# Patient Record
Sex: MALE | Race: WHITE | NOT HISPANIC OR LATINO | Employment: OTHER | URBAN - METROPOLITAN AREA
[De-identification: names, ages, dates, MRNs, and addresses within clinical notes are randomized per-mention and may not be internally consistent; named-entity substitution may affect disease eponyms.]

---

## 2018-06-07 ENCOUNTER — HOSPITAL ENCOUNTER (EMERGENCY)
Facility: HOSPITAL | Age: 23
Discharge: HOME | End: 2018-06-07
Attending: EMERGENCY MEDICINE

## 2018-06-07 VITALS
RESPIRATION RATE: 18 BRPM | OXYGEN SATURATION: 98 % | BODY MASS INDEX: 28.44 KG/M2 | DIASTOLIC BLOOD PRESSURE: 109 MMHG | SYSTOLIC BLOOD PRESSURE: 153 MMHG | HEART RATE: 81 BPM | WEIGHT: 210 LBS | HEIGHT: 72 IN | TEMPERATURE: 99 F

## 2018-06-07 DIAGNOSIS — F44.5 PSEUDOSEIZURE: Primary | ICD-10-CM

## 2018-06-07 PROCEDURE — 99282 EMERGENCY DEPT VISIT SF MDM: CPT

## 2018-06-08 NOTE — ED NOTES
Pt states he is feeling better and is ready to go home.  States he does not need Ativan he feels better.

## 2018-06-08 NOTE — ED PROVIDER NOTES
Encounter Date: 6/7/2018    SCRIBE #1 NOTE: I, Sabi Vargas, am scribing for, and in the presence of,  Dr. Jurado. I have scribed the entire note.       History     Chief Complaint   Patient presents with    Headache     pt to triage ambulatory in no distress and reports had a pseudoseizure today--- had chills and a pressure in head and pain 2/10; no seizure activity at present and pt aao and appropriate with clear speech and oriented    Seizures     Yogi Britt is a 22 y.o. male who  has no past medical history on file.    The patient presents to the ED due to headache. He reports onset of symptoms was a few hours ago. The patient states prior to onset of symptoms he had a pseudoseizure. He notes following the seizure he had cold sensation in his head and pressure. The patient believes he may have aneurysm. He denies any changes in vision, nausea, vomiting, or neck stiffness. The patient has concern he never experienced the cold sensation in the past.       The history is provided by the patient.     Review of patient's allergies indicates:  No Known Allergies  No past medical history on file.  No past surgical history on file.  No family history on file.  Social History   Substance Use Topics    Smoking status: Not on file    Smokeless tobacco: Not on file    Alcohol use Not on file     Review of Systems   Neurological: Positive for seizures (pseudoseizure) and headaches.   All other systems reviewed and are negative.      Physical Exam     Initial Vitals [06/07/18 2122]   BP Pulse Resp Temp SpO2   (!) 153/109 81 18 98.6 °F (37 °C) 98 %      MAP       123.67         Physical Exam    Nursing note and vitals reviewed.  Constitutional: He appears well-developed and well-nourished. He is not diaphoretic. No distress.   HENT:   Head: Normocephalic and atraumatic.   Mouth/Throat: Oropharynx is clear and moist.   Eyes: Conjunctivae and EOM are normal.   Neck: Normal range of motion. Neck supple.    Cardiovascular: Normal rate, regular rhythm and normal heart sounds. Exam reveals no gallop and no friction rub.    No murmur heard.  Pulmonary/Chest: Breath sounds normal. He has no wheezes. He has no rhonchi. He has no rales.   Abdominal: Soft. There is no tenderness. There is no rebound and no guarding.   Musculoskeletal: Normal range of motion. He exhibits no edema or tenderness.   Lymphadenopathy:     He has no cervical adenopathy.   Neurological: He is alert and oriented to person, place, and time. He has normal strength.   Skin: Skin is warm and dry. No rash noted.   Psychiatric: His mood appears anxious.         ED Course   Procedures  Labs Reviewed - No data to display       No orders to display        Medical Decision Making:   ED Management:  Pt convinced that he ruptured and aneurysm today because he had a pseudoseizure and then developed a cold sensation in his head.  I reassured the patient that he obviously doesn't have a ruptured aneurym and that a head ct is not indicated.  He was offered ativan but declined and stated that he wants to leave              Attending Attestation:           Physician Attestation for Scribe:  Physician Attestation Statement for Scribe #1: I, Greg Jurado, reviewed documentation, as scribed by Sabi Vargas in my presence, and it is both accurate and complete.                    Clinical Impression:     1. Pseudoseizure        Disposition:   Disposition: Discharged  Condition: Stable                        Greg Jurado MD  06/07/18 8683

## 2018-06-08 NOTE — ED NOTES
Patient identifiers for Yogi Britt checked and correct.  LOC: The patient is awake, alert and aware of environment with an appropriate affect, the patient is oriented x 3 and speaking appropriately.  APPEARANCE: Patient resting comfortably and in no acute distress, patient is clean and well groomed, patient's clothing are properly fastened.  SKIN: The skin is warm and dry, patient has normal skin turgor and moist mucus membranes, skin intact, no breakdown or brusing noted.  MUSKULOSKELETAL: Patient moving all extremities well, no obvious swelling or deformities noted.  RESPIRATORY: Airway is open and patent, respirations are spontaneous, patient has a normal effort and rate.

## 2022-01-22 ENCOUNTER — OFFICE VISIT (OUTPATIENT)
Dept: URGENT CARE | Facility: CLINIC | Age: 27
End: 2022-01-22
Payer: OTHER GOVERNMENT

## 2022-01-22 VITALS
HEART RATE: 91 BPM | SYSTOLIC BLOOD PRESSURE: 131 MMHG | TEMPERATURE: 99 F | DIASTOLIC BLOOD PRESSURE: 90 MMHG | BODY MASS INDEX: 29.8 KG/M2 | WEIGHT: 220 LBS | OXYGEN SATURATION: 98 % | HEIGHT: 72 IN

## 2022-01-22 DIAGNOSIS — U07.1 COVID-19: Primary | ICD-10-CM

## 2022-01-22 DIAGNOSIS — R09.81 CONGESTION OF PARANASAL SINUS: ICD-10-CM

## 2022-01-22 LAB
CTP QC/QA: YES
SARS-COV-2 RDRP RESP QL NAA+PROBE: POSITIVE

## 2022-01-22 PROCEDURE — U0002 COVID-19 LAB TEST NON-CDC: HCPCS | Mod: QW,S$GLB,, | Performed by: NURSE PRACTITIONER

## 2022-01-22 PROCEDURE — 99203 OFFICE O/P NEW LOW 30 MIN: CPT | Mod: S$GLB,,, | Performed by: NURSE PRACTITIONER

## 2022-01-22 PROCEDURE — 99203 PR OFFICE/OUTPT VISIT, NEW, LEVL III, 30-44 MIN: ICD-10-PCS | Mod: S$GLB,,, | Performed by: NURSE PRACTITIONER

## 2022-01-22 PROCEDURE — U0002: ICD-10-PCS | Mod: QW,S$GLB,, | Performed by: NURSE PRACTITIONER

## 2022-01-22 RX ORDER — AZELASTINE 1 MG/ML
1 SPRAY, METERED NASAL 2 TIMES DAILY PRN
Qty: 30 ML | Refills: 0 | Status: SHIPPED | OUTPATIENT
Start: 2022-01-22

## 2022-01-22 RX ORDER — BENZONATATE 100 MG/1
100 CAPSULE ORAL 3 TIMES DAILY PRN
Qty: 30 CAPSULE | Refills: 0 | Status: SHIPPED | OUTPATIENT
Start: 2022-01-22 | End: 2022-02-01

## 2022-01-22 RX ORDER — BUPROPION HYDROCHLORIDE 150 MG/1
150 TABLET ORAL DAILY
COMMUNITY

## 2022-01-22 NOTE — PROGRESS NOTES
Subjective:       Patient ID: Yogi Britt is a 26 y.o. male.    Vitals:  height is 6' (1.829 m) and weight is 99.8 kg (220 lb). His temperature is 98.8 °F (37.1 °C). His blood pressure is 131/90 (abnormal) and his pulse is 91. His oxygen saturation is 98%.     Chief Complaint: Sinus Problem    This is a 26 y.o. male who presents today with a chief complaint of ear pain, congestion, sore throat, cough and sneezing started one week ago, patient reports he is vaccinated, patient denies fever, body aches or chills, denies wheezing or shortness of breath, denies nausea, vomiting, diarrhea or abdominal pain, denies chest pain or dizziness positional lightheadedness, denies trouble swallowing, denies loss of taste or smell, or any other symptoms        Sinus Problem  This is a new problem. The current episode started in the past 7 days (7 days). The problem has been gradually worsening since onset. There has been no fever. His pain is at a severity of 3/10. The pain is mild. Associated symptoms include congestion, coughing, ear pain, headaches, sinus pressure, sneezing and a sore throat. Pertinent negatives include no chills, diaphoresis or shortness of breath. ( ear pressure ) Past treatments include oral decongestants (mucinex steam bath). The treatment provided mild relief.       Constitution: Negative for chills, sweating, fatigue and fever.   HENT: Positive for ear pain, congestion, sinus pressure and sore throat. Negative for trouble swallowing.    Respiratory: Positive for cough. Negative for chest tightness, sputum production and shortness of breath.    Gastrointestinal: Negative for abdominal pain, nausea, vomiting, constipation and diarrhea.   Allergic/Immunologic: Positive for sneezing. Negative for environmental allergies and seasonal allergies.   Neurological: Positive for headaches. Negative for dizziness and light-headedness.       Objective:      Physical Exam   Constitutional: He is oriented to person,  place, and time. He appears well-developed and well-nourished. He is cooperative.  Non-toxic appearance. He does not have a sickly appearance. He does not appear ill. No distress.   HENT:   Head: Normocephalic and atraumatic.   Ears:   Right Ear: Hearing, tympanic membrane, external ear and ear canal normal. Tympanic membrane is not erythematous. No middle ear effusion.   Left Ear: Hearing, tympanic membrane, external ear and ear canal normal. Tympanic membrane is not erythematous.  No middle ear effusion.   Nose: Nose normal. No mucosal edema, rhinorrhea or nasal deformity. No epistaxis. Right sinus exhibits no maxillary sinus tenderness and no frontal sinus tenderness. Left sinus exhibits no maxillary sinus tenderness and no frontal sinus tenderness.   Mouth/Throat: Uvula is midline, oropharynx is clear and moist and mucous membranes are normal. No trismus in the jaw. Normal dentition. No uvula swelling. No oropharyngeal exudate, posterior oropharyngeal edema, posterior oropharyngeal erythema, tonsillar abscesses or cobblestoning.   No sinus tenderness on exam      Comments: No sinus tenderness on exam  Eyes: Conjunctivae and lids are normal. No scleral icterus.   Neck: Phonation normal. Neck supple. No edema present. No erythema present. No neck rigidity present.   Cardiovascular: Normal rate, regular rhythm, normal heart sounds, intact distal pulses and normal pulses.   Pulmonary/Chest: Effort normal and breath sounds normal. No stridor. No respiratory distress. He has no decreased breath sounds. He has no wheezes. He has no rhonchi. He has no rales.   Abdominal: Normal appearance.   Musculoskeletal: Normal range of motion.         General: No deformity or edema. Normal range of motion.   Lymphadenopathy:     He has no cervical adenopathy.   Neurological: He is alert and oriented to person, place, and time. He exhibits normal muscle tone. Coordination normal.   Skin: Skin is warm, dry, intact, not diaphoretic  and not pale.   Psychiatric: He has a normal mood and affect. His speech is normal and behavior is normal. Judgment and thought content normal. Cognition and memory  Nursing note and vitals reviewed.        Results for orders placed or performed in visit on 01/22/22   POCT COVID-19 Rapid Screening   Result Value Ref Range    POC Rapid COVID Positive (A) Negative     Acceptable Yes          Patient in no acute distress.  Vitals reassuring. Positive covid 19 results discussed with patient in detail. Detailed education provided about COVID 19 precautions and recommendations as per CDC guidelines. Risk of complication score 1. Medications prescribed and oTC medications discussed.   Discussed results/diagnosis/plan in depth with patient in clinic. Strict precautions given to patient to monitor for worsening signs and symptoms. Advised to follow up with primary.All questions answered. Strict ER precautions given. If your symptoms worsens or fail to improve you should go to the Emergency Room. Discharge and follow-up instructions given verbally/printed. Discharge and follow-up instructions discussed with the patient who expressed understanding and willingness to comply with my recommendations.Patient voiced understanding and in agreement with current treatment plan.     Please be advised this text was dictated with [employer] software and may contain errors due to translation.      Assessment:       1. COVID-19    2. Congestion of paranasal sinus          Plan:         COVID-19  -     POCT COVID-19 Rapid Screening    Congestion of paranasal sinus    Other orders  -     benzonatate (TESSALON) 100 MG capsule; Take 1 capsule (100 mg total) by mouth 3 (three) times daily as needed for Cough.  Dispense: 30 capsule; Refill: 0  -     azelastine (ASTELIN) 137 mcg (0.1 %) nasal spray; 1 spray (137 mcg total) by Nasal route 2 (two) times daily as needed for Rhinitis.  Dispense: 30 mL; Refill: 0      Patient Instructions    PLEASE READ YOUR DISCHARGE INSTRUCTIONS ENTIRELY AS IT CONTAINS IMPORTANT INFORMATION.      Please drink plenty of fluids.    Please get plenty of rest.    Please return here or go to the Emergency Department for any concerns or worsening of condition.    Please take an over the counter antihistamine medication (allegra/Claritin/Zyrtec) of your choice as directed.    Try an over the counter decongestant like Mucinex D or Sudafed. You buy this behind the pharmacy counter    If you do have Hypertension or palpitations, it is safe to take Coricidin HBP for relief of sinus symptoms.    If not allergic, please take over the counter Tylenol (Acetaminophen) and/or Motrin (Ibuprofen) as directed for control of pain and/or fever.  Please follow up with your primary care doctor or specialist as needed.    Sore throat recommendations: Warm fluids, warm salt water gargles, throat lozenges, tea, honey, soup, rest, hydration.    Use over the counter flonase: one spray each nostril twice daily OR two sprays each nostril once daily.     If you  smoke, please stop smoking.      Please return or see your primary care doctor if you develop new or worsening symptoms.     Please arrange follow up with your primary medical clinic as soon as possible. You must understand that you've received an Urgent Care treatment only and that you may be released before all of your medical problems are known or treated. You, the patient, will arrange for follow up as instructed. If your symptoms worsen or fail to improve you should go to the Emergency Room.  Patient Education       COVID-19 Discharge Instructions   About this topic   Coronavirus disease 2019 is also known as COVID-19. It is a viral illness that infects the lungs. It is caused by a virus called SARS-associated coronavirus (SARS-CoV-2).  The signs of COVID-19 most often start a few days after you have been infected. In some people, it takes longer to show signs. Others never show signs  of the infection. You may have a cough, fever, shaking chills and it may be hard to breathe. You may be very tired, have muscle aches, a headache or sore throat. Some people have an upset stomach or loose stools. Others lose their sense of smell or taste. You may not have these signs all the time and they may come and go while you are sick.  The virus spreads easily through droplets when you talk, sneeze, or cough. You can pass the virus to others when you are talking close together, singing, hugging, sharing food, or shaking hands. Doctors believe the germs also survive on surfaces like tables, door handles, and telephones. However, this is not a common way that COVID-19 spreads. Doctors believe you can also spread the infection even if you dont have any symptoms, but they do not know how that happens. This is why getting vaccinated is one of the best ways to keep you healthy and slow the spread of the virus.  Some people have a mild case of COVID-19 and are able to stay at home and away from others until they feel better. Others may need to be in the hospital if they are very sick. Some people with COVID-19 can have some symptoms for weeks or months. People with COVID-19 must isolate themselves. You can start to be around others when your doctor says it is safe to do so.       What care is needed at home?   · Ask your doctor what you need to do when you go home. Make sure you ask questions if you do not understand what the doctor says.  · Drink lots of water, juice, or broth to replace fluids lost from a fever.  · You may use cool mist humidifiers to help ease congestion and coughing.  · Use 2 to 3 pillows to prop yourself up when you lie down to make it easier to breathe and sleep.  · Do not smoke and do not drink beer, wine, and mixed drinks (alcohol).  · To lower the chance of passing the infection to others, get a COVID-19 vaccine after your infection has resolved.  · If you have not been fully  vaccinated:  ? Wear a mask over your mouth and nose if you are around others who are not sick. Cloth masks work best if they have more than one layer of fabric.  ? Wash your hands often.  ? Stay home in a separate room, if possible, away from others. Only go out to get medical care.  ? Use a separate bathroom if possible.  ? Do not make food for others.  What follow-up care is needed?   · Your doctor may ask you to make visits to the office to check on your progress. Be sure to keep these visits. Make sure you wear a mask at these visits.  · If you can, tell the staff you have COVID-19 ahead of time so they can take extra care to stop the disease from spreading.  · It may take a few weeks before your health returns to normal.  What drugs may be needed?   The doctor may order drugs to:  · Help with breathing  · Help with fever  · Help with swelling in your airways and lungs  · Control coughing  · Ease a sore throat  · Help a runny or stuffy nose  Will physical activity be limited?   You may have to limit your physical activity. Talk to your doctor about the right amount of activity for you. If you have been very sick with COVID-19, it can take some time to get your strength back.  Will there be any other care needed?   Doctors do not know how long you can pass the virus on to others after you are sick. This is why it is important to stay in a separate room, if possible, when you are sick. For now, doctors are giving general guidelines for you to follow after you have been sick. Before you go around other people, you should:  · Be fever free for 24 hours without taking any drugs to lower the fever  · Have no symptoms of cough or shortness of breath  · Wait at least 10 days after first having symptoms or your first positive test, and you need to be symptom free as above. Some experts suggest waiting 20 days if you have had a more severe infection.  Talk with your doctor about getting a COVID-19 vaccine.  What problems  could happen?   · Fluid loss. This is dehydration.  · Short-term or long-term lung damage  · Heart problems  · Death  When do I need to call the doctor?   · You are having so much trouble breathing that you can only say one or two words at a time.  · You need to sit upright at all times to be able to breathe and/or cannot lie down.  · You are very confused or cannot stay awake.  · Your lips or skin start to turn blue or grey.  · You think you might be having a medical emergency. Some examples of medical emergencies are:  ? Severe chest pain.  ? Not able to speak or move normally.  · You have trouble breathing when talking or sitting still.  · You have new shortness of breath.  · You become weak or dizzy.  · You have very dark urine or do not pass urine for more than 8 hours.  · You have new or worsening COVID-19 symptoms like:  ? Fever  ? Cough  ? Feeling very tired  ? Shaking chills  ? Headache  ? Trouble swallowing  ? Throwing up  ? Loose stools  ? Reddish purple spots on your fingers or toes  Teach Back: Helping You Understand   The Teach Back Method helps you understand the information we are giving you. After you talk with the staff, tell them in your own words what you learned. This helps to make sure the staff has described each thing clearly. It also helps to explain things that may have been confusing. Before going home, make sure you can do these:  · I can tell you about my condition.  · I can tell you what may help ease my breathing.  · I can tell you what I can do to help avoid passing the infection to others.  · I can tell you what I will do if I have trouble breathing; feel sleepy or confused; or my fingertips, fingernails, skin, or lips are blue.  Where can I learn more?   Centers for Disease Control and Prevention  https://www.cdc.gov/coronavirus/2019-ncov/about/index.html   Centers for Disease Control and Prevention  https://www.cdc.gov/coronavirus/2019-ncov/hcp/disposition-in-home-patients.html    World Health Organization  https://www.who.int/news-room/q-a-detail/c-x-vkmwkpukfxnff   Last Reviewed Date   2021-10-05  Consumer Information Use and Disclaimer   This information is not specific medical advice and does not replace information you receive from your health care provider. This is only a brief summary of general information. It does NOT include all information about conditions, illnesses, injuries, tests, procedures, treatments, therapies, discharge instructions or life-style choices that may apply to you. You must talk with your health care provider for complete information about your health and treatment options. This information should not be used to decide whether or not to accept your health care providers advice, instructions or recommendations. Only your health care provider has the knowledge and training to provide advice that is right for you.  Copyright   Copyright © 2021 [employer] and its affiliates and/or licensors. All rights reserved.

## 2022-01-22 NOTE — PATIENT INSTRUCTIONS
PLEASE READ YOUR DISCHARGE INSTRUCTIONS ENTIRELY AS IT CONTAINS IMPORTANT INFORMATION.      Please drink plenty of fluids.    Please get plenty of rest.    Please return here or go to the Emergency Department for any concerns or worsening of condition.    Please take an over the counter antihistamine medication (allegra/Claritin/Zyrtec) of your choice as directed.    Try an over the counter decongestant like Mucinex D or Sudafed. You buy this behind the pharmacy counter    If you do have Hypertension or palpitations, it is safe to take Coricidin HBP for relief of sinus symptoms.    If not allergic, please take over the counter Tylenol (Acetaminophen) and/or Motrin (Ibuprofen) as directed for control of pain and/or fever.  Please follow up with your primary care doctor or specialist as needed.    Sore throat recommendations: Warm fluids, warm salt water gargles, throat lozenges, tea, honey, soup, rest, hydration.    Use over the counter flonase: one spray each nostril twice daily OR two sprays each nostril once daily.     If you  smoke, please stop smoking.      Please return or see your primary care doctor if you develop new or worsening symptoms.     Please arrange follow up with your primary medical clinic as soon as possible. You must understand that you've received an Urgent Care treatment only and that you may be released before all of your medical problems are known or treated. You, the patient, will arrange for follow up as instructed. If your symptoms worsen or fail to improve you should go to the Emergency Room.  Patient Education       COVID-19 Discharge Instructions   About this topic   Coronavirus disease 2019 is also known as COVID-19. It is a viral illness that infects the lungs. It is caused by a virus called SARS-associated coronavirus (SARS-CoV-2).  The signs of COVID-19 most often start a few days after you have been infected. In some people, it takes longer to show signs. Others never show signs  of the infection. You may have a cough, fever, shaking chills and it may be hard to breathe. You may be very tired, have muscle aches, a headache or sore throat. Some people have an upset stomach or loose stools. Others lose their sense of smell or taste. You may not have these signs all the time and they may come and go while you are sick.  The virus spreads easily through droplets when you talk, sneeze, or cough. You can pass the virus to others when you are talking close together, singing, hugging, sharing food, or shaking hands. Doctors believe the germs also survive on surfaces like tables, door handles, and telephones. However, this is not a common way that COVID-19 spreads. Doctors believe you can also spread the infection even if you dont have any symptoms, but they do not know how that happens. This is why getting vaccinated is one of the best ways to keep you healthy and slow the spread of the virus.  Some people have a mild case of COVID-19 and are able to stay at home and away from others until they feel better. Others may need to be in the hospital if they are very sick. Some people with COVID-19 can have some symptoms for weeks or months. People with COVID-19 must isolate themselves. You can start to be around others when your doctor says it is safe to do so.       What care is needed at home?   · Ask your doctor what you need to do when you go home. Make sure you ask questions if you do not understand what the doctor says.  · Drink lots of water, juice, or broth to replace fluids lost from a fever.  · You may use cool mist humidifiers to help ease congestion and coughing.  · Use 2 to 3 pillows to prop yourself up when you lie down to make it easier to breathe and sleep.  · Do not smoke and do not drink beer, wine, and mixed drinks (alcohol).  · To lower the chance of passing the infection to others, get a COVID-19 vaccine after your infection has resolved.  · If you have not been fully  vaccinated:  ? Wear a mask over your mouth and nose if you are around others who are not sick. Cloth masks work best if they have more than one layer of fabric.  ? Wash your hands often.  ? Stay home in a separate room, if possible, away from others. Only go out to get medical care.  ? Use a separate bathroom if possible.  ? Do not make food for others.  What follow-up care is needed?   · Your doctor may ask you to make visits to the office to check on your progress. Be sure to keep these visits. Make sure you wear a mask at these visits.  · If you can, tell the staff you have COVID-19 ahead of time so they can take extra care to stop the disease from spreading.  · It may take a few weeks before your health returns to normal.  What drugs may be needed?   The doctor may order drugs to:  · Help with breathing  · Help with fever  · Help with swelling in your airways and lungs  · Control coughing  · Ease a sore throat  · Help a runny or stuffy nose  Will physical activity be limited?   You may have to limit your physical activity. Talk to your doctor about the right amount of activity for you. If you have been very sick with COVID-19, it can take some time to get your strength back.  Will there be any other care needed?   Doctors do not know how long you can pass the virus on to others after you are sick. This is why it is important to stay in a separate room, if possible, when you are sick. For now, doctors are giving general guidelines for you to follow after you have been sick. Before you go around other people, you should:  · Be fever free for 24 hours without taking any drugs to lower the fever  · Have no symptoms of cough or shortness of breath  · Wait at least 10 days after first having symptoms or your first positive test, and you need to be symptom free as above. Some experts suggest waiting 20 days if you have had a more severe infection.  Talk with your doctor about getting a COVID-19 vaccine.  What problems  could happen?   · Fluid loss. This is dehydration.  · Short-term or long-term lung damage  · Heart problems  · Death  When do I need to call the doctor?   · You are having so much trouble breathing that you can only say one or two words at a time.  · You need to sit upright at all times to be able to breathe and/or cannot lie down.  · You are very confused or cannot stay awake.  · Your lips or skin start to turn blue or grey.  · You think you might be having a medical emergency. Some examples of medical emergencies are:  ? Severe chest pain.  ? Not able to speak or move normally.  · You have trouble breathing when talking or sitting still.  · You have new shortness of breath.  · You become weak or dizzy.  · You have very dark urine or do not pass urine for more than 8 hours.  · You have new or worsening COVID-19 symptoms like:  ? Fever  ? Cough  ? Feeling very tired  ? Shaking chills  ? Headache  ? Trouble swallowing  ? Throwing up  ? Loose stools  ? Reddish purple spots on your fingers or toes  Teach Back: Helping You Understand   The Teach Back Method helps you understand the information we are giving you. After you talk with the staff, tell them in your own words what you learned. This helps to make sure the staff has described each thing clearly. It also helps to explain things that may have been confusing. Before going home, make sure you can do these:  · I can tell you about my condition.  · I can tell you what may help ease my breathing.  · I can tell you what I can do to help avoid passing the infection to others.  · I can tell you what I will do if I have trouble breathing; feel sleepy or confused; or my fingertips, fingernails, skin, or lips are blue.  Where can I learn more?   Centers for Disease Control and Prevention  https://www.cdc.gov/coronavirus/2019-ncov/about/index.html   Centers for Disease Control and Prevention  https://www.cdc.gov/coronavirus/2019-ncov/hcp/disposition-in-home-patients.html    World Health Organization  https://www.who.int/news-room/q-a-detail/p-x-okeokcjvnampg   Last Reviewed Date   2021-10-05  Consumer Information Use and Disclaimer   This information is not specific medical advice and does not replace information you receive from your health care provider. This is only a brief summary of general information. It does NOT include all information about conditions, illnesses, injuries, tests, procedures, treatments, therapies, discharge instructions or life-style choices that may apply to you. You must talk with your health care provider for complete information about your health and treatment options. This information should not be used to decide whether or not to accept your health care providers advice, instructions or recommendations. Only your health care provider has the knowledge and training to provide advice that is right for you.  Copyright   Copyright © 2021 [employer] and its affiliates and/or licensors. All rights reserved.

## 2023-04-19 ENCOUNTER — OCCUPATIONAL HEALTH (OUTPATIENT)
Dept: URGENT CARE | Facility: CLINIC | Age: 28
End: 2023-04-19

## 2023-04-19 VITALS
OXYGEN SATURATION: 98 % | WEIGHT: 231.19 LBS | SYSTOLIC BLOOD PRESSURE: 130 MMHG | HEART RATE: 75 BPM | HEIGHT: 72 IN | BODY MASS INDEX: 31.31 KG/M2 | DIASTOLIC BLOOD PRESSURE: 88 MMHG | TEMPERATURE: 99 F | RESPIRATION RATE: 17 BRPM

## 2023-04-19 DIAGNOSIS — Z00.00 ENCOUNTER FOR PHYSICAL EXAMINATION: Primary | ICD-10-CM

## 2023-04-19 PROCEDURE — 99499 UNLISTED E&M SERVICE: CPT | Mod: S$GLB,,, | Performed by: FAMILY MEDICINE

## 2023-04-19 PROCEDURE — 80305 OOH NON-DOT DRUG SCREEN: ICD-10-PCS | Mod: S$GLB,,, | Performed by: FAMILY MEDICINE

## 2023-04-19 PROCEDURE — 80305 DRUG TEST PRSMV DIR OPT OBS: CPT | Mod: S$GLB,,, | Performed by: FAMILY MEDICINE

## 2023-04-19 PROCEDURE — 99499 PHYSICAL, BASIC COMPLEXITY: ICD-10-PCS | Mod: S$GLB,,, | Performed by: FAMILY MEDICINE

## 2023-04-19 RX ORDER — DESVENLAFAXINE SUCCINATE 50 MG/1
100 TABLET, EXTENDED RELEASE ORAL DAILY
COMMUNITY

## 2023-04-19 NOTE — PROGRESS NOTES
Subjective:      Patient ID: Yogi Britt is a 27 y.o. male.    Chief Complaint: No chief complaint on file.    SRL  ROS  Objective:     Physical Exam   Assessment:      No diagnosis found.  Plan:                 No follow-ups on file.

## 2024-08-11 ENCOUNTER — HOSPITAL ENCOUNTER (INPATIENT)
Facility: HOSPITAL | Age: 29
LOS: 1 days | Discharge: HOME | DRG: 916 | End: 2024-08-13
Attending: STUDENT IN AN ORGANIZED HEALTH CARE EDUCATION/TRAINING PROGRAM | Admitting: FAMILY MEDICINE
Payer: OTHER GOVERNMENT

## 2024-08-11 DIAGNOSIS — R57.9 SHOCK: ICD-10-CM

## 2024-08-11 DIAGNOSIS — R65.10 SIRS (SYSTEMIC INFLAMMATORY RESPONSE SYNDROME): ICD-10-CM

## 2024-08-11 DIAGNOSIS — T78.2XXA ANAPHYLACTIC SHOCK: Primary | ICD-10-CM

## 2024-08-11 DIAGNOSIS — I95.9 HYPOTENSION: ICD-10-CM

## 2024-08-11 DIAGNOSIS — R07.9 CHEST PAIN: ICD-10-CM

## 2024-08-11 PROBLEM — D75.1 POLYCYTHEMIA: Status: ACTIVE | Noted: 2024-08-11

## 2024-08-11 PROBLEM — D72.829 LEUCOCYTOSIS: Status: ACTIVE | Noted: 2024-08-11

## 2024-08-11 PROBLEM — K52.9 ENTEROCOLITIS: Status: ACTIVE | Noted: 2024-08-11

## 2024-08-11 PROBLEM — K52.9 ENTEROCOLITIS: Status: RESOLVED | Noted: 2024-08-11 | Resolved: 2024-08-11

## 2024-08-11 LAB
ABO + RH BLD: NORMAL
ALBUMIN SERPL BCP-MCNC: 3.5 G/DL (ref 3.5–5.2)
ALP SERPL-CCNC: 59 U/L (ref 55–135)
ALT SERPL W/O P-5'-P-CCNC: 23 U/L (ref 10–44)
ANION GAP SERPL CALC-SCNC: 10 MMOL/L (ref 8–16)
AST SERPL-CCNC: 22 U/L (ref 10–40)
BACTERIA #/AREA URNS HPF: ABNORMAL /HPF
BASOPHILS # BLD AUTO: 0.05 K/UL (ref 0–0.2)
BASOPHILS # BLD AUTO: 0.09 K/UL (ref 0–0.2)
BASOPHILS # BLD AUTO: 0.12 K/UL (ref 0–0.2)
BASOPHILS NFR BLD: 0.2 % (ref 0–1.9)
BASOPHILS NFR BLD: 0.4 % (ref 0–1.9)
BASOPHILS NFR BLD: 0.5 % (ref 0–1.9)
BILIRUB SERPL-MCNC: 0.3 MG/DL (ref 0.1–1)
BILIRUB UR QL STRIP: NEGATIVE
BLD GP AB SCN CELLS X3 SERPL QL: NORMAL
BNP SERPL-MCNC: 13 PG/ML (ref 0–99)
BUN SERPL-MCNC: 18 MG/DL (ref 6–20)
CALCIUM SERPL-MCNC: 8.1 MG/DL (ref 8.7–10.5)
CHLORIDE SERPL-SCNC: 106 MMOL/L (ref 95–110)
CLARITY UR: CLEAR
CO2 SERPL-SCNC: 20 MMOL/L (ref 23–29)
COLOR UR: YELLOW
CREAT SERPL-MCNC: 1 MG/DL (ref 0.5–1.4)
DIFFERENTIAL METHOD BLD: ABNORMAL
EOSINOPHIL # BLD AUTO: 0 K/UL (ref 0–0.5)
EOSINOPHIL # BLD AUTO: 0.2 K/UL (ref 0–0.5)
EOSINOPHIL # BLD AUTO: 0.2 K/UL (ref 0–0.5)
EOSINOPHIL NFR BLD: 0 % (ref 0–8)
EOSINOPHIL NFR BLD: 0.6 % (ref 0–8)
EOSINOPHIL NFR BLD: 1 % (ref 0–8)
ERYTHROCYTE [DISTWIDTH] IN BLOOD BY AUTOMATED COUNT: 12.3 % (ref 11.5–14.5)
ERYTHROCYTE [DISTWIDTH] IN BLOOD BY AUTOMATED COUNT: 12.3 % (ref 11.5–14.5)
ERYTHROCYTE [DISTWIDTH] IN BLOOD BY AUTOMATED COUNT: 12.4 % (ref 11.5–14.5)
EST. GFR  (NO RACE VARIABLE): >60 ML/MIN/1.73 M^2
GLUCOSE SERPL-MCNC: 169 MG/DL (ref 70–110)
GLUCOSE SERPL-MCNC: 199 MG/DL (ref 70–110)
GLUCOSE SERPL-MCNC: 215 MG/DL (ref 70–110)
GLUCOSE SERPL-MCNC: 242 MG/DL (ref 70–110)
GLUCOSE SERPL-MCNC: 267 MG/DL (ref 70–110)
GLUCOSE UR QL STRIP: ABNORMAL
HCT VFR BLD AUTO: 53.7 % (ref 40–54)
HCT VFR BLD AUTO: 57.9 % (ref 40–54)
HCT VFR BLD AUTO: 60.2 % (ref 40–54)
HGB BLD-MCNC: 18.4 G/DL (ref 14–18)
HGB BLD-MCNC: 20.4 G/DL (ref 14–18)
HGB BLD-MCNC: 20.4 G/DL (ref 14–18)
HGB UR QL STRIP: NEGATIVE
HYALINE CASTS #/AREA URNS LPF: 2 /LPF
IMM GRANULOCYTES # BLD AUTO: 0.15 K/UL (ref 0–0.04)
IMM GRANULOCYTES # BLD AUTO: 0.29 K/UL (ref 0–0.04)
IMM GRANULOCYTES # BLD AUTO: 0.32 K/UL (ref 0–0.04)
IMM GRANULOCYTES NFR BLD AUTO: 0.8 % (ref 0–0.5)
IMM GRANULOCYTES NFR BLD AUTO: 1 % (ref 0–0.5)
IMM GRANULOCYTES NFR BLD AUTO: 1.4 % (ref 0–0.5)
KETONES UR QL STRIP: ABNORMAL
LACTATE SERPL-SCNC: 2.8 MMOL/L (ref 0.5–1.9)
LACTATE SERPL-SCNC: 3.7 MMOL/L (ref 0.5–1.9)
LEUKOCYTE ESTERASE UR QL STRIP: NEGATIVE
LIPASE SERPL-CCNC: 36 U/L (ref 4–60)
LYMPHOCYTES # BLD AUTO: 1.1 K/UL (ref 1–4.8)
LYMPHOCYTES # BLD AUTO: 11.2 K/UL (ref 1–4.8)
LYMPHOCYTES # BLD AUTO: 6.5 K/UL (ref 1–4.8)
LYMPHOCYTES NFR BLD: 19.8 % (ref 18–48)
LYMPHOCYTES NFR BLD: 5.5 % (ref 18–48)
LYMPHOCYTES NFR BLD: 57.6 % (ref 18–48)
MAGNESIUM SERPL-MCNC: 1.9 MG/DL (ref 1.6–2.6)
MCH RBC QN AUTO: 30.1 PG (ref 27–31)
MCH RBC QN AUTO: 30.2 PG (ref 27–31)
MCH RBC QN AUTO: 30.3 PG (ref 27–31)
MCHC RBC AUTO-ENTMCNC: 33.9 G/DL (ref 32–36)
MCHC RBC AUTO-ENTMCNC: 34.3 G/DL (ref 32–36)
MCHC RBC AUTO-ENTMCNC: 35.2 G/DL (ref 32–36)
MCV RBC AUTO: 86 FL (ref 82–98)
MCV RBC AUTO: 88 FL (ref 82–98)
MCV RBC AUTO: 90 FL (ref 82–98)
MICROSCOPIC COMMENT: ABNORMAL
MONOCYTES # BLD AUTO: 0.3 K/UL (ref 0.3–1)
MONOCYTES # BLD AUTO: 0.5 K/UL (ref 0.3–1)
MONOCYTES # BLD AUTO: 1.6 K/UL (ref 0.3–1)
MONOCYTES NFR BLD: 1.2 % (ref 4–15)
MONOCYTES NFR BLD: 2.7 % (ref 4–15)
MONOCYTES NFR BLD: 4.9 % (ref 4–15)
NEUTROPHILS # BLD AUTO: 18.5 K/UL (ref 1.8–7.7)
NEUTROPHILS # BLD AUTO: 24.1 K/UL (ref 1.8–7.7)
NEUTROPHILS # BLD AUTO: 7.3 K/UL (ref 1.8–7.7)
NEUTROPHILS NFR BLD: 37.4 % (ref 38–73)
NEUTROPHILS NFR BLD: 73.3 % (ref 38–73)
NEUTROPHILS NFR BLD: 91.7 % (ref 38–73)
NITRITE UR QL STRIP: NEGATIVE
NRBC BLD-RTO: 0 /100 WBC
OB PNL STL: NEGATIVE
OHS QRS DURATION: 92 MS
OHS QTC CALCULATION: 471 MS
PH UR STRIP: 6 [PH] (ref 5–8)
PLATELET # BLD AUTO: 290 K/UL (ref 150–450)
PLATELET # BLD AUTO: 360 K/UL (ref 150–450)
PLATELET # BLD AUTO: 397 K/UL (ref 150–450)
PLATELET BLD QL SMEAR: ABNORMAL
PMV BLD AUTO: 10 FL (ref 9.2–12.9)
PMV BLD AUTO: 9.6 FL (ref 9.2–12.9)
PMV BLD AUTO: 9.8 FL (ref 9.2–12.9)
POTASSIUM SERPL-SCNC: 3.9 MMOL/L (ref 3.5–5.1)
PROT SERPL-MCNC: 5.7 G/DL (ref 6–8.4)
PROT UR QL STRIP: ABNORMAL
RBC # BLD AUTO: 6.12 M/UL (ref 4.6–6.2)
RBC # BLD AUTO: 6.73 M/UL (ref 4.6–6.2)
RBC # BLD AUTO: 6.75 M/UL (ref 4.6–6.2)
RBC #/AREA URNS HPF: 1 /HPF (ref 0–4)
SODIUM SERPL-SCNC: 136 MMOL/L (ref 136–145)
SP GR UR STRIP: >1.03 (ref 1–1.03)
SPECIMEN OUTDATE: NORMAL
SQUAMOUS #/AREA URNS HPF: 1 /HPF
TROPONIN I SERPL HS-MCNC: 3.5 PG/ML (ref 0–14.9)
TSH SERPL DL<=0.005 MIU/L-ACNC: 3.31 UIU/ML (ref 0.34–5.6)
URN SPEC COLLECT METH UR: ABNORMAL
UROBILINOGEN UR STRIP-ACNC: NEGATIVE EU/DL
WBC # BLD AUTO: 19.47 K/UL (ref 3.9–12.7)
WBC # BLD AUTO: 20.22 K/UL (ref 3.9–12.7)
WBC # BLD AUTO: 32.8 K/UL (ref 3.9–12.7)
WBC #/AREA URNS HPF: 1 /HPF (ref 0–5)
YEAST URNS QL MICRO: ABNORMAL

## 2024-08-11 PROCEDURE — 84443 ASSAY THYROID STIM HORMONE: CPT | Performed by: STUDENT IN AN ORGANIZED HEALTH CARE EDUCATION/TRAINING PROGRAM

## 2024-08-11 PROCEDURE — 83036 HEMOGLOBIN GLYCOSYLATED A1C: CPT | Performed by: STUDENT IN AN ORGANIZED HEALTH CARE EDUCATION/TRAINING PROGRAM

## 2024-08-11 PROCEDURE — 81001 URINALYSIS AUTO W/SCOPE: CPT | Performed by: STUDENT IN AN ORGANIZED HEALTH CARE EDUCATION/TRAINING PROGRAM

## 2024-08-11 PROCEDURE — G0378 HOSPITAL OBSERVATION PER HR: HCPCS

## 2024-08-11 PROCEDURE — 99291 CRITICAL CARE FIRST HOUR: CPT

## 2024-08-11 PROCEDURE — 82962 GLUCOSE BLOOD TEST: CPT

## 2024-08-11 PROCEDURE — 63600175 PHARM REV CODE 636 W HCPCS: Performed by: STUDENT IN AN ORGANIZED HEALTH CARE EDUCATION/TRAINING PROGRAM

## 2024-08-11 PROCEDURE — 86901 BLOOD TYPING SEROLOGIC RH(D): CPT | Performed by: STUDENT IN AN ORGANIZED HEALTH CARE EDUCATION/TRAINING PROGRAM

## 2024-08-11 PROCEDURE — 25000003 PHARM REV CODE 250: Performed by: STUDENT IN AN ORGANIZED HEALTH CARE EDUCATION/TRAINING PROGRAM

## 2024-08-11 PROCEDURE — 99205 OFFICE O/P NEW HI 60 MIN: CPT | Mod: ,,, | Performed by: INTERNAL MEDICINE

## 2024-08-11 PROCEDURE — 82668 ASSAY OF ERYTHROPOIETIN: CPT | Performed by: STUDENT IN AN ORGANIZED HEALTH CARE EDUCATION/TRAINING PROGRAM

## 2024-08-11 PROCEDURE — 96366 THER/PROPH/DIAG IV INF ADDON: CPT

## 2024-08-11 PROCEDURE — 84484 ASSAY OF TROPONIN QUANT: CPT | Performed by: STUDENT IN AN ORGANIZED HEALTH CARE EDUCATION/TRAINING PROGRAM

## 2024-08-11 PROCEDURE — 86850 RBC ANTIBODY SCREEN: CPT | Performed by: STUDENT IN AN ORGANIZED HEALTH CARE EDUCATION/TRAINING PROGRAM

## 2024-08-11 PROCEDURE — 87040 BLOOD CULTURE FOR BACTERIA: CPT | Performed by: STUDENT IN AN ORGANIZED HEALTH CARE EDUCATION/TRAINING PROGRAM

## 2024-08-11 PROCEDURE — 83690 ASSAY OF LIPASE: CPT | Performed by: STUDENT IN AN ORGANIZED HEALTH CARE EDUCATION/TRAINING PROGRAM

## 2024-08-11 PROCEDURE — 93010 ELECTROCARDIOGRAM REPORT: CPT | Mod: ,,, | Performed by: INTERNAL MEDICINE

## 2024-08-11 PROCEDURE — 36415 COLL VENOUS BLD VENIPUNCTURE: CPT | Performed by: STUDENT IN AN ORGANIZED HEALTH CARE EDUCATION/TRAINING PROGRAM

## 2024-08-11 PROCEDURE — 96361 HYDRATE IV INFUSION ADD-ON: CPT

## 2024-08-11 PROCEDURE — 96367 TX/PROPH/DG ADDL SEQ IV INF: CPT

## 2024-08-11 PROCEDURE — 83880 ASSAY OF NATRIURETIC PEPTIDE: CPT | Performed by: STUDENT IN AN ORGANIZED HEALTH CARE EDUCATION/TRAINING PROGRAM

## 2024-08-11 PROCEDURE — 85027 COMPLETE CBC AUTOMATED: CPT | Performed by: STUDENT IN AN ORGANIZED HEALTH CARE EDUCATION/TRAINING PROGRAM

## 2024-08-11 PROCEDURE — 96375 TX/PRO/DX INJ NEW DRUG ADDON: CPT

## 2024-08-11 PROCEDURE — 36415 COLL VENOUS BLD VENIPUNCTURE: CPT | Performed by: INTERNAL MEDICINE

## 2024-08-11 PROCEDURE — 85025 COMPLETE CBC W/AUTO DIFF WBC: CPT | Performed by: STUDENT IN AN ORGANIZED HEALTH CARE EDUCATION/TRAINING PROGRAM

## 2024-08-11 PROCEDURE — 25500020 PHARM REV CODE 255: Performed by: STUDENT IN AN ORGANIZED HEALTH CARE EDUCATION/TRAINING PROGRAM

## 2024-08-11 PROCEDURE — 83605 ASSAY OF LACTIC ACID: CPT | Mod: 91 | Performed by: STUDENT IN AN ORGANIZED HEALTH CARE EDUCATION/TRAINING PROGRAM

## 2024-08-11 PROCEDURE — 63600175 PHARM REV CODE 636 W HCPCS

## 2024-08-11 PROCEDURE — 83735 ASSAY OF MAGNESIUM: CPT | Performed by: STUDENT IN AN ORGANIZED HEALTH CARE EDUCATION/TRAINING PROGRAM

## 2024-08-11 PROCEDURE — 96365 THER/PROPH/DIAG IV INF INIT: CPT | Mod: 59

## 2024-08-11 PROCEDURE — 93005 ELECTROCARDIOGRAM TRACING: CPT | Performed by: INTERNAL MEDICINE

## 2024-08-11 PROCEDURE — 85007 BL SMEAR W/DIFF WBC COUNT: CPT | Performed by: STUDENT IN AN ORGANIZED HEALTH CARE EDUCATION/TRAINING PROGRAM

## 2024-08-11 PROCEDURE — 82272 OCCULT BLD FECES 1-3 TESTS: CPT | Performed by: STUDENT IN AN ORGANIZED HEALTH CARE EDUCATION/TRAINING PROGRAM

## 2024-08-11 PROCEDURE — 80053 COMPREHEN METABOLIC PANEL: CPT | Performed by: STUDENT IN AN ORGANIZED HEALTH CARE EDUCATION/TRAINING PROGRAM

## 2024-08-11 PROCEDURE — 96372 THER/PROPH/DIAG INJ SC/IM: CPT | Performed by: STUDENT IN AN ORGANIZED HEALTH CARE EDUCATION/TRAINING PROGRAM

## 2024-08-11 PROCEDURE — 83520 IMMUNOASSAY QUANT NOS NONAB: CPT | Performed by: STUDENT IN AN ORGANIZED HEALTH CARE EDUCATION/TRAINING PROGRAM

## 2024-08-11 PROCEDURE — 86900 BLOOD TYPING SEROLOGIC ABO: CPT | Performed by: STUDENT IN AN ORGANIZED HEALTH CARE EDUCATION/TRAINING PROGRAM

## 2024-08-11 PROCEDURE — 85025 COMPLETE CBC W/AUTO DIFF WBC: CPT | Mod: 91 | Performed by: STUDENT IN AN ORGANIZED HEALTH CARE EDUCATION/TRAINING PROGRAM

## 2024-08-11 RX ORDER — CETIRIZINE HYDROCHLORIDE 5 MG/1
10 TABLET ORAL DAILY
Status: DISCONTINUED | OUTPATIENT
Start: 2024-08-11 | End: 2024-08-13 | Stop reason: HOSPADM

## 2024-08-11 RX ORDER — PIOGLITAZONEHYDROCHLORIDE 15 MG/1
15 TABLET ORAL DAILY
COMMUNITY
Start: 2024-03-19

## 2024-08-11 RX ORDER — SODIUM CHLORIDE 0.9 % (FLUSH) 0.9 %
10 SYRINGE (ML) INJECTION EVERY 12 HOURS PRN
Status: DISCONTINUED | OUTPATIENT
Start: 2024-08-11 | End: 2024-08-13 | Stop reason: HOSPADM

## 2024-08-11 RX ORDER — ONDANSETRON HYDROCHLORIDE 2 MG/ML
4 INJECTION, SOLUTION INTRAVENOUS
Status: COMPLETED | OUTPATIENT
Start: 2024-08-11 | End: 2024-08-11

## 2024-08-11 RX ORDER — INSULIN ASPART 100 [IU]/ML
0-5 INJECTION, SOLUTION INTRAVENOUS; SUBCUTANEOUS
Status: DISCONTINUED | OUTPATIENT
Start: 2024-08-11 | End: 2024-08-12

## 2024-08-11 RX ORDER — CETIRIZINE HYDROCHLORIDE 10 MG/1
10 TABLET ORAL DAILY
Qty: 30 TABLET | Refills: 0 | Status: CANCELLED | OUTPATIENT
Start: 2024-08-12 | End: 2024-09-11

## 2024-08-11 RX ORDER — METHYLPREDNISOLONE SOD SUCC 125 MG
125 VIAL (EA) INJECTION
Status: COMPLETED | OUTPATIENT
Start: 2024-08-11 | End: 2024-08-11

## 2024-08-11 RX ORDER — SODIUM CHLORIDE, SODIUM LACTATE, POTASSIUM CHLORIDE, CALCIUM CHLORIDE 600; 310; 30; 20 MG/100ML; MG/100ML; MG/100ML; MG/100ML
INJECTION, SOLUTION INTRAVENOUS CONTINUOUS
Status: ACTIVE | OUTPATIENT
Start: 2024-08-11 | End: 2024-08-13

## 2024-08-11 RX ORDER — FAMOTIDINE 10 MG/ML
20 INJECTION INTRAVENOUS
Status: COMPLETED | OUTPATIENT
Start: 2024-08-11 | End: 2024-08-11

## 2024-08-11 RX ORDER — GLUCAGON 1 MG
1 KIT INJECTION
Status: DISCONTINUED | OUTPATIENT
Start: 2024-08-11 | End: 2024-08-13 | Stop reason: HOSPADM

## 2024-08-11 RX ORDER — EPINEPHRINE 0.3 MG/.3ML
0.3 INJECTION SUBCUTANEOUS
Status: DISCONTINUED | OUTPATIENT
Start: 2024-08-11 | End: 2024-08-11

## 2024-08-11 RX ORDER — NALOXONE HCL 0.4 MG/ML
0.02 VIAL (ML) INJECTION
Status: DISCONTINUED | OUTPATIENT
Start: 2024-08-11 | End: 2024-08-13 | Stop reason: HOSPADM

## 2024-08-11 RX ORDER — FAMOTIDINE 20 MG/1
20 TABLET, FILM COATED ORAL 2 TIMES DAILY
Status: DISCONTINUED | OUTPATIENT
Start: 2024-08-11 | End: 2024-08-13 | Stop reason: HOSPADM

## 2024-08-11 RX ORDER — SODIUM,POTASSIUM PHOSPHATES 280-250MG
2 POWDER IN PACKET (EA) ORAL
Status: DISCONTINUED | OUTPATIENT
Start: 2024-08-11 | End: 2024-08-13 | Stop reason: HOSPADM

## 2024-08-11 RX ORDER — ENOXAPARIN SODIUM 100 MG/ML
40 INJECTION SUBCUTANEOUS EVERY 24 HOURS
Status: DISCONTINUED | OUTPATIENT
Start: 2024-08-11 | End: 2024-08-13 | Stop reason: HOSPADM

## 2024-08-11 RX ORDER — IBUPROFEN 200 MG
24 TABLET ORAL
Status: DISCONTINUED | OUTPATIENT
Start: 2024-08-11 | End: 2024-08-13 | Stop reason: HOSPADM

## 2024-08-11 RX ORDER — DIPHENHYDRAMINE HYDROCHLORIDE 50 MG/ML
50 INJECTION INTRAMUSCULAR; INTRAVENOUS
Status: COMPLETED | OUTPATIENT
Start: 2024-08-11 | End: 2024-08-11

## 2024-08-11 RX ORDER — IBUPROFEN 200 MG
16 TABLET ORAL
Status: DISCONTINUED | OUTPATIENT
Start: 2024-08-11 | End: 2024-08-13 | Stop reason: HOSPADM

## 2024-08-11 RX ORDER — EPINEPHRINE 1 MG/ML
INJECTION, SOLUTION, CONCENTRATE INTRAVENOUS
Status: COMPLETED
Start: 2024-08-11 | End: 2024-08-11

## 2024-08-11 RX ORDER — METHYLPREDNISOLONE SOD SUCC 125 MG
125 VIAL (EA) INJECTION DAILY
Status: DISCONTINUED | OUTPATIENT
Start: 2024-08-12 | End: 2024-08-11

## 2024-08-11 RX ORDER — CIPROFLOXACIN 500 MG/1
500 TABLET ORAL EVERY 12 HOURS
Qty: 10 TABLET | Refills: 0 | Status: CANCELLED | OUTPATIENT
Start: 2024-08-11 | End: 2024-08-16

## 2024-08-11 RX ORDER — LANOLIN ALCOHOL/MO/W.PET/CERES
800 CREAM (GRAM) TOPICAL
Status: DISCONTINUED | OUTPATIENT
Start: 2024-08-11 | End: 2024-08-13 | Stop reason: HOSPADM

## 2024-08-11 RX ORDER — FAMOTIDINE 20 MG/1
20 TABLET, FILM COATED ORAL 2 TIMES DAILY
Qty: 60 TABLET | Refills: 0 | Status: CANCELLED | OUTPATIENT
Start: 2024-08-11 | End: 2024-09-10

## 2024-08-11 RX ORDER — ONDANSETRON HYDROCHLORIDE 2 MG/ML
4 INJECTION, SOLUTION INTRAVENOUS EVERY 8 HOURS PRN
Status: DISCONTINUED | OUTPATIENT
Start: 2024-08-11 | End: 2024-08-13 | Stop reason: HOSPADM

## 2024-08-11 RX ADMIN — FAMOTIDINE 20 MG: 20 TABLET ORAL at 08:08

## 2024-08-11 RX ADMIN — PIPERACILLIN SODIUM AND TAZOBACTAM SODIUM 3.38 G: 3; .375 INJECTION, POWDER, LYOPHILIZED, FOR SOLUTION INTRAVENOUS at 03:08

## 2024-08-11 RX ADMIN — IOHEXOL 100 ML: 350 INJECTION, SOLUTION INTRAVENOUS at 02:08

## 2024-08-11 RX ADMIN — INSULIN ASPART 1 UNITS: 100 INJECTION, SOLUTION INTRAVENOUS; SUBCUTANEOUS at 09:08

## 2024-08-11 RX ADMIN — SODIUM CHLORIDE, POTASSIUM CHLORIDE, SODIUM LACTATE AND CALCIUM CHLORIDE: 600; 310; 30; 20 INJECTION, SOLUTION INTRAVENOUS at 06:08

## 2024-08-11 RX ADMIN — SODIUM CHLORIDE, POTASSIUM CHLORIDE, SODIUM LACTATE AND CALCIUM CHLORIDE: 600; 310; 30; 20 INJECTION, SOLUTION INTRAVENOUS at 09:08

## 2024-08-11 RX ADMIN — SODIUM CHLORIDE, POTASSIUM CHLORIDE, SODIUM LACTATE AND CALCIUM CHLORIDE 1000 ML: 600; 310; 30; 20 INJECTION, SOLUTION INTRAVENOUS at 12:08

## 2024-08-11 RX ADMIN — CETIRIZINE HYDROCHLORIDE 10 MG: 5 TABLET ORAL at 08:08

## 2024-08-11 RX ADMIN — SODIUM CHLORIDE 2000 ML: 9 INJECTION, SOLUTION INTRAVENOUS at 01:08

## 2024-08-11 RX ADMIN — INSULIN ASPART 3 UNITS: 100 INJECTION, SOLUTION INTRAVENOUS; SUBCUTANEOUS at 05:08

## 2024-08-11 RX ADMIN — EPINEPHRINE 1 MG: 1 INJECTION, SOLUTION, CONCENTRATE INTRAVENOUS at 01:08

## 2024-08-11 RX ADMIN — EPINEPHRINE 0.02 MCG/KG/MIN: 1 INJECTION INTRAMUSCULAR; INTRAVENOUS; SUBCUTANEOUS at 01:08

## 2024-08-11 RX ADMIN — FAMOTIDINE 20 MG: 10 INJECTION INTRAVENOUS at 01:08

## 2024-08-11 RX ADMIN — METHYLPREDNISOLONE SODIUM SUCCINATE 125 MG: 125 INJECTION, POWDER, FOR SOLUTION INTRAMUSCULAR; INTRAVENOUS at 01:08

## 2024-08-11 RX ADMIN — PIPERACILLIN SODIUM AND TAZOBACTAM SODIUM 3.38 G: 3; .375 INJECTION, POWDER, LYOPHILIZED, FOR SOLUTION INTRAVENOUS at 04:08

## 2024-08-11 RX ADMIN — ONDANSETRON 4 MG: 2 INJECTION INTRAMUSCULAR; INTRAVENOUS at 03:08

## 2024-08-11 RX ADMIN — DIPHENHYDRAMINE HYDROCHLORIDE 50 MG: 50 INJECTION INTRAMUSCULAR; INTRAVENOUS at 01:08

## 2024-08-11 RX ADMIN — ENOXAPARIN SODIUM 40 MG: 40 INJECTION SUBCUTANEOUS at 04:08

## 2024-08-11 RX ADMIN — INSULIN ASPART 2 UNITS: 100 INJECTION, SOLUTION INTRAVENOUS; SUBCUTANEOUS at 08:08

## 2024-08-11 NOTE — H&P
[facility] - Emergency Dept  Hospital Medicine  History & Physical    Patient Name: Yogi Britt  MRN: [id number]  Patient Class: OP- Observation  Admission Date: 8/11/2024  Attending Physician: Natalee Edward MD  Primary Care Provider: Kristy Prieto DO         Patient information was obtained from patient and ER records.     Subjective:     Principal Problem:Shock    Chief Complaint: No chief complaint on file.       HPI: 28-year-old male with past medical history of anxiety, diabetes and elevated BMI on Ozempic that presents to the ED for evaluation of sudden onset generalized numbness, itching, blurry vision, weakness, abdominal pain, shortness of breath, nausea, diarrhea, and vomiting that started 30 minutes before presentation.  Patient discloses no cough, fever, or chills.  Patient has no dysuria or hematuria.  Patient was not sure if he had a rash. Patient discloses no past history of these symptoms.  Patient discloses last meal was tropical can fruits.  Patient can not estimate the time interval between eating the can fruits and onset of his symptoms.  Patient discloses past history of GI bleed and was found to have multiple ulcers in his intestine and [facility] as [facility].  Patient does not smoke but drinks occasionally.  Patient discloses his brother is allergy to strawberry.  Patient was brought by EMS, initially normotensive but became hypotensive.  Patient was noted to have skin rash.  Labs remarkable for elevated white blood cell, polycythemia, elevated blood glucose.  Chest x-ray showed no infiltrate.  CT abdomen showed diffuse mild wall thickening throughout the small bowel and colon, mild associated fat stranding, small volume free fluid in the abdomen and pelvis, mild hepatomegaly with diffuse steatosis.  In the ED patient received epinephrine injection, diphenhydramine, methylprednisolone, famotidine Zofran, to L of normal saline.  Patient was started on  epinephrine drip and received empiric antibiotics with Zosyn.  Hospital medicine consulted for admission.    Past Medical History:   Diagnosis Date    Anxiety     Seizures        No past surgical history on file.    Review of patient's allergies indicates:  No Known Allergies    No current facility-administered medications on file prior to encounter.     Current Outpatient Medications on File Prior to Encounter   Medication Sig    azelastine (ASTELIN) 137 mcg (0.1 %) nasal spray 1 spray (137 mcg total) by Nasal route 2 (two) times daily as needed for Rhinitis.    buPROPion (WELLBUTRIN XL) 150 MG TB24 tablet Take 150 mg by mouth once daily.    desvenlafaxine succinate (PRISTIQ) 50 MG Tb24 Take 100 mg by mouth once daily.     Family History    None       Tobacco Use    Smoking status: Never    Smokeless tobacco: Never   Substance and Sexual Activity    Alcohol use: Not on file    Drug use: Not on file    Sexual activity: Not on file     Review of Systems   Constitutional:  Positive for fatigue. Negative for chills and fever.   Respiratory:  Positive for shortness of breath. Negative for wheezing and stridor.    Cardiovascular:  Negative for chest pain, palpitations and leg swelling.   Gastrointestinal:  Positive for diarrhea, nausea and vomiting.   Genitourinary:  Negative for dysuria.   Skin:  Positive for color change.   Neurological:  Positive for numbness.   Psychiatric/Behavioral:  Negative for agitation and confusion.      Objective:     Vital Signs (Most Recent):  Temp: 97.7 °F (36.5 °C) (08/11/24 0122)  Pulse: 94 (08/11/24 0411)  Resp: 19 (08/11/24 0411)  BP: 99/65 (08/11/24 0411)  SpO2: 96 % (08/11/24 0416) Vital Signs (24h Range):  Temp:  [97.7 °F (36.5 °C)] 97.7 °F (36.5 °C)  Pulse:  [] 94  Resp:  [14-19] 19  SpO2:  [95 %-100 %] 96 %  BP: ()/(40-75) 99/65     Weight: 104.3 kg (230 lb)  Body mass index is 31.19 kg/m².     Physical Exam  Vitals reviewed.   Constitutional:       General: He is not  in acute distress.     Appearance: Normal appearance. He is not ill-appearing.   HENT:      Head: Normocephalic and atraumatic.   Eyes:      Conjunctiva/sclera: Conjunctivae normal.      Pupils: Pupils are equal, round, and reactive to light.   Cardiovascular:      Rate and Rhythm: Normal rate and regular rhythm.      Pulses: Normal pulses.      Heart sounds: No murmur heard.  Pulmonary:      Effort: Pulmonary effort is normal. No respiratory distress.      Breath sounds: Normal breath sounds. No wheezing or rhonchi.   Abdominal:      General: Abdomen is flat. There is no distension.      Palpations: Abdomen is soft.      Tenderness: There is no abdominal tenderness.   Musculoskeletal:      Right lower leg: No edema.      Left lower leg: No edema.   Neurological:      General: No focal deficit present.      Mental Status: He is alert and oriented to person, place, and time.   Psychiatric:         Mood and Affect: Mood normal.         Behavior: Behavior normal.              CRANIAL NERVES     CN III, IV, VI   Pupils are equal, round, and reactive to light.       Significant Labs: All pertinent labs within the past 24 hours have been reviewed.  Recent Lab Results         08/11/24  0144   08/11/24  0139   08/11/24  0127   08/11/24  0114        Albumin     3.5         ALP     59         ALT     23         Anion Gap     10         AST     22         Baso #     0.09         Basophil %     0.5         BILIRUBIN TOTAL     0.3  Comment: For infants and newborns, interpretation of results should be based  on gestational age, weight and in agreement with clinical  observations.    Premature Infant recommended reference ranges:  Up to 24 hours.............<8.0 mg/dL  Up to 48 hours............<12.0 mg/dL  3-5 days..................<15.0 mg/dL  6-29 days.................<15.0 mg/dL           BNP     13  Comment: Values of less than 100 pg/ml are consistent with non-CHF populations.         BUN     18         Calcium     8.1          Chloride     106         CO2     20         Creatinine     1.0         Differential Method     Automated         eGFR     >60.0         Eos #     0.2         Eos %     1.0         Glucose     199         Gran # (ANC)     7.3         Gran %     37.4         Group & Rh   B POS           Hematocrit     57.9         Hemoglobin     20.4  Comment: Hgb critical result(s) called and verbal readback obtained from Anais Pruitt RN (ER) by SWMitchell 08/11/2024 02:04           Immature Grans (Abs)     0.15  Comment: Mild elevation in immature granulocytes is non specific and   can be seen in a variety of conditions including stress response,   acute inflammation, trauma and pregnancy. Correlation with other   laboratory and clinical findings is essential.           Immature Granulocytes     0.8         INDIRECT ROXANA   NEG           Lipase     36         Lymph #     11.2         Lymph %     57.6         Magnesium      1.9         MCH     30.2         MCHC     35.2         MCV     86         Mono #     0.5         Mono %     2.7         MPV     9.6         nRBC     0         Occult Blood Negative             Platelet Count     397         POC Glucose       169       Potassium     3.9         PROTEIN TOTAL     5.7         RBC     6.75         RDW     12.3         Sodium     136         Specimen Outdate   08/14/2024 23:59           Troponin I High Sensitivity     3.5  Comment: Troponin results differ between methods. Do not use   results between Troponin methods interchangeably.    Alkaline Phospatase levels above 400 U/L may   cause false positive results.    Access hsTnI should not be used for patients taking   Asfotase mata (Strensiq).           WBC     19.47                 Significant Imaging: I have reviewed all pertinent imaging results/findings within the past 24 hours.    CLINICAL HISTORY:  Sepsis;     TECHNIQUE:  Single frontal view of the chest was performed.     COMPARISON:  None     FINDINGS:  Patient is rotated  limiting assessment.  Cardiac monitoring leads and pacing pads overlie and partially obscure the chest.  Cardiac silhouette is not significantly enlarged.  Lung volumes are mildly diminished with resultant bronchovascular crowding.  No large confluent airspace consolidation appreciated.  No significant volume of pleural fluid or pneumothorax identified.  The visualized osseous structures appear intact.     Impression:     No convincing radiographic evidence of acute intrathoracic process on this single view.        Electronically signed by:Rosalio Gibson MD  Date:                                            08/11/2024  Time:                                           01:55           Exam Ended: 08/11/24 01:40 CDT Last Resulted: 08/11/24 01:55 CDT             EXAMINATION:  CT ABDOMEN PELVIS WITH IV CONTRAST     CLINICAL HISTORY:  Abdominal pain, acute, nonlocalized;     TECHNIQUE:  Low dose axial images, sagittal and coronal reformations were obtained from the lung bases to the pubic symphysis following the IV administration of 100 mL of Omnipaque 350 .  Oral contrast was not administered.     COMPARISON:  None.     FINDINGS:  Abdomen:     - Lower thorax:Unremarkable.     - Lung bases: No infiltrates and no nodules.     - Liver: Mild hepatomegaly.  Diffuse steatosis.     - Gallbladder: No calcified gallstones.     - Bile Ducts: No evidence of intra or extra hepatic biliary ductal dilation.     - Spleen: Negative.     - Kidneys: No mass or hydronephrosis.     - Adrenals: Unremarkable.     - Pancreas: No mass or peripancreatic fat stranding.     - Retroperitoneum:  No significant adenopathy.     - Vascular: No abdominal aortic aneurysm.     - Abdominal wall:  Unremarkable.     Pelvis:     Small volume free fluid in the pelvis and about the liver and spleen.  No fluid collections.  Bladder is empty.     Bowel/Mesentery:     Diffuse colonic wall thickening from the cecum through the sigmoid colon.  Diffuse mild wall  thickening throughout the small bowel.  Mild fat stranding present about the bowel.  No bowel obstruction.  Normal appendix.     Bones:  No acute osseous abnormality and no suspicious lytic or blastic lesion.     Impression:     Diffuse mild wall thickening throughout the small bowel and colon, as above.  Mild associated fat stranding.  Small volume free fluid in the abdomen and pelvis.  No bowel obstruction.  Findings suggest nonspecific enterocolitis.  Correlate clinically.     Mild hepatomegaly with diffuse steatosis.        Electronically signed by:Tim Aguilera MD  Date:                                            08/11/2024  Time:                                           03:34           Exam Ended: 08/11/24 02:49 CDT Last Resulted: 08/11/24 03:34 CDT             Assessment/Plan:     * Shock  Anaphylactic shock versus septic shock vs preformed toxin ingestion vs mast cell disorder  Follow-up blood culture  Urinalysis with reflex culture  Check tryptase level  Continue empiric antibiotics with Zosyn  Continue Solu-Medrol for 1 more day  Continue famotidine and cetirizine  IV fluid lactated ringer  Epinephrine drip as needed to keep map above 65  Admit to ICU for close monitoring  Will need EpiPen on discharge and allergy referral    Enterocolitis  Continue empiric antibiotics      Polycythemia  Unclear etiology.  Rule out polycythemia vera  Check erythropoietin level  If persists consider Hematology consult        VTE Risk Mitigation (From admission, onward)           Ordered     enoxaparin injection 40 mg  Daily         08/11/24 0519     IP VTE HIGH RISK PATIENT  Once         08/11/24 0519     Place sequential compression device  Until discontinued         08/11/24 0519                       On 08/11/2024, patient should be placed in hospital observation services under my care.             Natalee Edward MD  Department of Hospital Medicine  [facility] - Emergency Dept

## 2024-08-11 NOTE — NURSING
Nurses Note -- 4 Eyes      8/11/2024   7:04 AM      Skin assessed during: Admit      [x] No Altered Skin Integrity Present    []Prevention Measures Documented      [] Yes- Altered Skin Integrity Present or Discovered   [] LDA Added if Not in Epic (Describe Wound)   [] New Altered Skin Integrity was Present on Admit and Documented in LDA   [] Wound Image Taken    Wound Care Consulted? No    Attending Nurse:  Amadou Sanatna RN/Staff Member:   Anjana BAIG

## 2024-08-11 NOTE — CARE UPDATE
Patient with history of anxiety, diabetes presented with sudden onset itching, weakness, blurry vision, abdominal pain, nausea, diarrhea, vomiting starting shortly after eating a meal with topical Can fruits.  Concern for possible anaphylaxis versus food poisoning.  Hypotensive in the emergency room, started on epi drip.  CT scan showed colitis.  Patient received epi injection, Benadryl, Solu-Medrol and started on empiric antibiotics.  Transferred to the ICU.  Symptoms improving, weaned off of epi drip.  Hematology consulted for polycythemia.  Transfer out of the ICU.

## 2024-08-11 NOTE — HPI
Mr. Britt is feeling much better today.  No new complaints and he has not had any recurrence of his admitting symptoms.

## 2024-08-11 NOTE — ED PROVIDER NOTES
Encounter Date: 8/11/2024       History   No chief complaint on file.    28-year-old male presents for evaluation of hypotension.  Associated nausea vomiting and skin rash, started shortly prior to arrival.  Patient brought in by EMS, initially normotensive then proceeded to become hypotensive.  Patient received 50 mL fluid prior to arrival    The history is provided by the patient and the EMS personnel.     Review of patient's allergies indicates:  No Known Allergies  Past Medical History:   Diagnosis Date    Anxiety     Seizures      No past surgical history on file.  No family history on file.  Social History     Tobacco Use    Smoking status: Never    Smokeless tobacco: Never     Review of Systems   All other systems reviewed and are negative.      Physical Exam     Initial Vitals   BP Pulse Resp Temp SpO2   08/11/24 0116 08/11/24 0116 08/11/24 0116 08/11/24 0122 08/11/24 0116   (!) 70/50 88 14 97.7 °F (36.5 °C) 100 %      MAP       --                Physical Exam    Nursing note and vitals reviewed.  Constitutional: Vital signs are normal.  Non-toxic appearance. He appears distressed.   HENT:   Head: Normocephalic.   Eyes: No scleral icterus.   Pulmonary/Chest: No stridor. No respiratory distress.   Bilateral chest rise   Abdominal: There is no guarding.   Musculoskeletal:         General: No tenderness.      Cervical back: No rigidity.     Neurological: He is alert.   Skin: Skin is warm and dry. Rash noted.   Psychiatric: His speech is normal. He is not actively hallucinating.   Not anxious  or agitated         ED Course   Critical Care    Date/Time: 8/11/2024 5:05 AM    Performed by: Sedrick Snider Jr., DO  Authorized by: Sedrick Snider Jr., DO  Direct patient critical care time: 15 minutes  Additional history critical care time: 10 minutes  Ordering / reviewing critical care time: 10 minutes  Documentation critical care time: 10 minutes  Total critical care time (exclusive of procedural time) : 45  minutes        Labs Reviewed   CBC W/ AUTO DIFFERENTIAL - Abnormal       Result Value    WBC 19.47 (*)     RBC 6.75 (*)     Hemoglobin 20.4 (*)     Hematocrit 57.9 (*)     MCV 86      MCH 30.2      MCHC 35.2      RDW 12.3      Platelets 397      MPV 9.6      Immature Granulocytes 0.8 (*)     Gran # (ANC) 7.3      Immature Grans (Abs) 0.15 (*)     Lymph # 11.2 (*)     Mono # 0.5      Eos # 0.2      Baso # 0.09      nRBC 0      Gran % 37.4 (*)     Lymph % 57.6 (*)     Mono % 2.7 (*)     Eosinophil % 1.0      Basophil % 0.5      Differential Method Automated      Narrative:     Hgb critical result(s) called and verbal readback obtained from Anais Pruitt RN (ER) by SWMitchell 08/11/2024 02:04   COMPREHENSIVE METABOLIC PANEL - Abnormal    Sodium 136      Potassium 3.9      Chloride 106      CO2 20 (*)     Glucose 199 (*)     BUN 18      Creatinine 1.0      Calcium 8.1 (*)     Total Protein 5.7 (*)     Albumin 3.5      Total Bilirubin 0.3      Alkaline Phosphatase 59      AST 22      ALT 23      eGFR >60.0      Anion Gap 10     POCT GLUCOSE - Abnormal    POC Glucose 169 (*)    CULTURE, BLOOD   MAGNESIUM    Magnesium 1.9     B-TYPE NATRIURETIC PEPTIDE    BNP 13     LIPASE    Lipase 36     TROPONIN I HIGH SENSITIVITY    Troponin I High Sensitivity 3.5     OCCULT BLOOD X 1, STOOL    Occult Blood Negative     LACTIC ACID, PLASMA   URINALYSIS, REFLEX TO URINE CULTURE   TYPE & SCREEN    Group & Rh B POS      Indirect Zay NEG      Specimen Outdate 08/14/2024 23:59     POCT LACTATE          Imaging Results              CT Abdomen Pelvis With IV Contrast NO Oral Contrast (Final result)  Result time 08/11/24 03:34:25      Final result by Tim Aguilera MD (08/11/24 03:34:25)                   Impression:      Diffuse mild wall thickening throughout the small bowel and colon, as above.  Mild associated fat stranding.  Small volume free fluid in the abdomen and pelvis.  No bowel obstruction.  Findings suggest nonspecific  enterocolitis.  Correlate clinically.    Mild hepatomegaly with diffuse steatosis.      Electronically signed by: Tim Aguilera MD  Date:    08/11/2024  Time:    03:34               Narrative:    EXAMINATION:  CT ABDOMEN PELVIS WITH IV CONTRAST    CLINICAL HISTORY:  Abdominal pain, acute, nonlocalized;    TECHNIQUE:  Low dose axial images, sagittal and coronal reformations were obtained from the lung bases to the pubic symphysis following the IV administration of 100 mL of Omnipaque 350 .  Oral contrast was not administered.    COMPARISON:  None.    FINDINGS:  Abdomen:    - Lower thorax:Unremarkable.    - Lung bases: No infiltrates and no nodules.    - Liver: Mild hepatomegaly.  Diffuse steatosis.    - Gallbladder: No calcified gallstones.    - Bile Ducts: No evidence of intra or extra hepatic biliary ductal dilation.    - Spleen: Negative.    - Kidneys: No mass or hydronephrosis.    - Adrenals: Unremarkable.    - Pancreas: No mass or peripancreatic fat stranding.    - Retroperitoneum:  No significant adenopathy.    - Vascular: No abdominal aortic aneurysm.    - Abdominal wall:  Unremarkable.    Pelvis:    Small volume free fluid in the pelvis and about the liver and spleen.  No fluid collections.  Bladder is empty.    Bowel/Mesentery:    Diffuse colonic wall thickening from the cecum through the sigmoid colon.  Diffuse mild wall thickening throughout the small bowel.  Mild fat stranding present about the bowel.  No bowel obstruction.  Normal appendix.    Bones:  No acute osseous abnormality and no suspicious lytic or blastic lesion.                                       X-Ray Chest AP Portable (Final result)  Result time 08/11/24 01:55:01      Final result by Rosalio Gibson MD (08/11/24 01:55:01)                   Impression:      No convincing radiographic evidence of acute intrathoracic process on this single view.      Electronically signed by: Rosalio Gibson MD  Date:    08/11/2024  Time:    01:55                Narrative:    EXAMINATION:  XR CHEST AP PORTABLE    CLINICAL HISTORY:  Sepsis;    TECHNIQUE:  Single frontal view of the chest was performed.    COMPARISON:  None    FINDINGS:  Patient is rotated limiting assessment.  Cardiac monitoring leads and pacing pads overlie and partially obscure the chest.  Cardiac silhouette is not significantly enlarged.  Lung volumes are mildly diminished with resultant bronchovascular crowding.  No large confluent airspace consolidation appreciated.  No significant volume of pleural fluid or pneumothorax identified.  The visualized osseous structures appear intact.                                       Medications   EPINEPHrine (ADRENALIN) 5 mg in D5W 250 mL infusion (0 mcg/kg/min × 104.3 kg Intravenous Paused 8/11/24 0200)   piperacillin-tazobactam 3.375 g in dextrose 5 % 100 mL IVPB (ready to mix) (3.375 g Intravenous New Bag 8/11/24 0330)   EPINEPHrine (PF) (ADRENALIN) 1 mg/mL (1 mL) injection (1 mg  Given 8/11/24 0118)   diphenhydrAMINE injection 50 mg (50 mg Intravenous Given 8/11/24 0129)   methylPREDNISolone sodium succinate injection 125 mg (125 mg Intravenous Given 8/11/24 0129)   famotidine (PF) injection 20 mg (20 mg Intravenous Given 8/11/24 0129)   sodium chloride 0.9% bolus 2,000 mL 2,000 mL (0 mLs Intravenous Stopped 8/11/24 0306)   iohexoL (OMNIPAQUE 350) injection 100 mL (100 mLs Intravenous Given 8/11/24 0249)   ondansetron injection 4 mg (4 mg Intravenous Given 8/11/24 0325)     Medical Decision Making  28-year-old male presents for hypotension, he has concern he is in shock.  For the time he was brought in by EMS initially normotensive then became hypotensive with systolic of 50, on evaluation had a rash, vomiting.  Within differential diagnosis includes septic shock versus hemorrhagic shock versus anaphylactic shock, patient treated as anaphylactic shock and sepsis protocol initiated and administered broad-spectrum antibiotics.  No definitive bacterial  source determine.  Hypotension Symptoms improved with 30 mL/kg bolus and IM epinephrine, on re-evaluation patient at his baseline, normotensive, agreeable with admission for further management and evaluation, CT abdomen and pelvis demonstrated enteritis.  Urinalysis pending at time of admission.    Amount and/or Complexity of Data Reviewed  Labs: ordered. Decision-making details documented in ED Course.  Radiology: ordered.  ECG/medicine tests: ordered and independent interpretation performed.     Details: EKG rate 88, QRS 92, , normal sinus rhythm, no STEMI  Discussion of management or test interpretation with external provider(s): Dr Edward- Accepting hospitalist    Risk  Prescription drug management.  Decision regarding hospitalization.               ED Course as of 08/11/24 0506   Sun Aug 11, 2024   0148 Patient currently normotensive after initial interventions, epi drip ordered however withheld at this time.  Still 30 mL/kg bolus fluids not completed however repeat perfusion assessment improved at 1:49 a.m. [KB]   0201 Unable to obtain two set of blood cultures. [KB]   0222 Occult Blood: Negative [KB]   0222 WBC(!): 19.47 [KB]   0222 Hemoglobin(!!): 20.4 [KB]   0222 Hematocrit(!): 57.9 [KB]   0223 Sodium: 136 [KB]   0223 Potassium: 3.9 [KB]   0223 Chloride: 106 [KB]   0223 CO2(!): 20 [KB]   0223 Glucose(!): 199 [KB]   0223 BUN: 18 [KB]   0223 Creatinine: 1.0 [KB]   0223 Calcium(!): 8.1 [KB]   0223 PROTEIN TOTAL(!): 5.7 [KB]   0223 ALP: 59 [KB]   0223 AST: 22 [KB]   0223 ALT: 23 [KB]   0223 eGFR: >60.0 [KB]   0500 Group & Rh: B POS [KB]   0501 This patient unknown have evidence of infective focus  My overall impression is Anaphylactic shock.  Source: Unknown  Antibiotics given- Antibiotics (72h ago, onward)    Start     Stop Route Frequency Ordered    08/11/24 0230  piperacillin-tazobactam 3.375 g in dextrose 5 % 100 mL   IVPB (ready to mix)         08/11/24 1429 IV ED 1 Time 08/11/24 0216      Latest  "lactate reviewed-  @[email or web address](lactate,poclac)@  Organ dysfunction indicated by N/A    Fluid challenge Ideal Body Weight- The patient's ideal body weight is [unfilled] which will be used to calculate fluid bolus of 30 ml/kg for treatment of septic shock.      Post- resuscitation assessment Yes Perfusion exam was performed within 6 hours of septic shock presentation after bolus shows Adequate tissue perfusion assessed by non-invasive monitoring       Will resolved Pressors- Levophed for MAP of 65  Source control achieved by: Mirthasyn   [KB]      ED Course User Index  [KB] Sedrick Snider Jr.,       Sepsis Perfusion Assessment: "I attest a sepsis perfusion exam was performed within 6 hours of sepsis, severe sepsis, or septic shock presentation, following fluid resuscitation."                      Clinical Impression:  Final diagnoses:  [I95.9] Hypotension  [T78.2XXA] Anaphylactic shock (Primary)  [R65.10] SIRS (systemic inflammatory response syndrome)          ED Disposition Condition    Admit Stable                Sedrick Snider Jr., DO  08/11/24 0505       Sedrick Snider Jr., DO  08/11/24 0506    "

## 2024-08-11 NOTE — ASSESSMENT & PLAN NOTE
Unclear etiology.  Rule out polycythemia vera  Check erythropoietin level  If persists consider Hematology consult

## 2024-08-11 NOTE — SUBJECTIVE & OBJECTIVE
Past Medical History:   Diagnosis Date    Anxiety     Seizures        No past surgical history on file.    Review of patient's allergies indicates:  No Known Allergies    No current facility-administered medications on file prior to encounter.     Current Outpatient Medications on File Prior to Encounter   Medication Sig    azelastine (ASTELIN) 137 mcg (0.1 %) nasal spray 1 spray (137 mcg total) by Nasal route 2 (two) times daily as needed for Rhinitis.    buPROPion (WELLBUTRIN XL) 150 MG TB24 tablet Take 150 mg by mouth once daily.    desvenlafaxine succinate (PRISTIQ) 50 MG Tb24 Take 100 mg by mouth once daily.     Family History    None       Tobacco Use    Smoking status: Never    Smokeless tobacco: Never   Substance and Sexual Activity    Alcohol use: Not on file    Drug use: Not on file    Sexual activity: Not on file     Review of Systems   Constitutional:  Positive for fatigue. Negative for chills and fever.   Respiratory:  Positive for shortness of breath. Negative for wheezing and stridor.    Cardiovascular:  Negative for chest pain, palpitations and leg swelling.   Gastrointestinal:  Positive for diarrhea, nausea and vomiting.   Genitourinary:  Negative for dysuria.   Skin:  Positive for color change.   Neurological:  Positive for numbness.   Psychiatric/Behavioral:  Negative for agitation and confusion.      Objective:     Vital Signs (Most Recent):  Temp: 97.7 °F (36.5 °C) (08/11/24 0122)  Pulse: 94 (08/11/24 0411)  Resp: 19 (08/11/24 0411)  BP: 99/65 (08/11/24 0411)  SpO2: 96 % (08/11/24 0416) Vital Signs (24h Range):  Temp:  [97.7 °F (36.5 °C)] 97.7 °F (36.5 °C)  Pulse:  [] 94  Resp:  [14-19] 19  SpO2:  [95 %-100 %] 96 %  BP: ()/(40-75) 99/65     Weight: 104.3 kg (230 lb)  Body mass index is 31.19 kg/m².     Physical Exam  Vitals reviewed.   Constitutional:       General: He is not in acute distress.     Appearance: Normal appearance. He is not ill-appearing.   HENT:      Head:  Normocephalic and atraumatic.   Eyes:      Conjunctiva/sclera: Conjunctivae normal.      Pupils: Pupils are equal, round, and reactive to light.   Cardiovascular:      Rate and Rhythm: Normal rate and regular rhythm.      Pulses: Normal pulses.      Heart sounds: No murmur heard.  Pulmonary:      Effort: Pulmonary effort is normal. No respiratory distress.      Breath sounds: Normal breath sounds. No wheezing or rhonchi.   Abdominal:      General: Abdomen is flat. There is no distension.      Palpations: Abdomen is soft.      Tenderness: There is no abdominal tenderness.   Musculoskeletal:      Right lower leg: No edema.      Left lower leg: No edema.   Neurological:      General: No focal deficit present.      Mental Status: He is alert and oriented to person, place, and time.   Psychiatric:         Mood and Affect: Mood normal.         Behavior: Behavior normal.              CRANIAL NERVES     CN III, IV, VI   Pupils are equal, round, and reactive to light.       Significant Labs: All pertinent labs within the past 24 hours have been reviewed.  Recent Lab Results         08/11/24  0144   08/11/24  0139   08/11/24  0127   08/11/24  0114        Albumin     3.5         ALP     59         ALT     23         Anion Gap     10         AST     22         Baso #     0.09         Basophil %     0.5         BILIRUBIN TOTAL     0.3  Comment: For infants and newborns, interpretation of results should be based  on gestational age, weight and in agreement with clinical  observations.    Premature Infant recommended reference ranges:  Up to 24 hours.............<8.0 mg/dL  Up to 48 hours............<12.0 mg/dL  3-5 days..................<15.0 mg/dL  6-29 days.................<15.0 mg/dL           BNP     13  Comment: Values of less than 100 pg/ml are consistent with non-CHF populations.         BUN     18         Calcium     8.1         Chloride     106         CO2     20         Creatinine     1.0         Differential Method      Automated         eGFR     >60.0         Eos #     0.2         Eos %     1.0         Glucose     199         Gran # (ANC)     7.3         Gran %     37.4         Group & Rh   B POS           Hematocrit     57.9         Hemoglobin     20.4  Comment: Hgb critical result(s) called and verbal readback obtained from Anais Pruitt RN (ER) by SWMitchell 08/11/2024 02:04           Immature Grans (Abs)     0.15  Comment: Mild elevation in immature granulocytes is non specific and   can be seen in a variety of conditions including stress response,   acute inflammation, trauma and pregnancy. Correlation with other   laboratory and clinical findings is essential.           Immature Granulocytes     0.8         INDIRECT ROXANA   NEG           Lipase     36         Lymph #     11.2         Lymph %     57.6         Magnesium      1.9         MCH     30.2         MCHC     35.2         MCV     86         Mono #     0.5         Mono %     2.7         MPV     9.6         nRBC     0         Occult Blood Negative             Platelet Count     397         POC Glucose       169       Potassium     3.9         PROTEIN TOTAL     5.7         RBC     6.75         RDW     12.3         Sodium     136         Specimen Outdate   08/14/2024 23:59           Troponin I High Sensitivity     3.5  Comment: Troponin results differ between methods. Do not use   results between Troponin methods interchangeably.    Alkaline Phospatase levels above 400 U/L may   cause false positive results.    Access hsTnI should not be used for patients taking   Asfotase mata (Strensiq).           WBC     19.47                 Significant Imaging: I have reviewed all pertinent imaging results/findings within the past 24 hours.    CLINICAL HISTORY:  Sepsis;     TECHNIQUE:  Single frontal view of the chest was performed.     COMPARISON:  None     FINDINGS:  Patient is rotated limiting assessment.  Cardiac monitoring leads and pacing pads overlie and partially obscure the chest.   Cardiac silhouette is not significantly enlarged.  Lung volumes are mildly diminished with resultant bronchovascular crowding.  No large confluent airspace consolidation appreciated.  No significant volume of pleural fluid or pneumothorax identified.  The visualized osseous structures appear intact.     Impression:     No convincing radiographic evidence of acute intrathoracic process on this single view.        Electronically signed by:Rosalio Gibson MD  Date:                                            08/11/2024  Time:                                           01:55           Exam Ended: 08/11/24 01:40 CDT Last Resulted: 08/11/24 01:55 CDT             EXAMINATION:  CT ABDOMEN PELVIS WITH IV CONTRAST     CLINICAL HISTORY:  Abdominal pain, acute, nonlocalized;     TECHNIQUE:  Low dose axial images, sagittal and coronal reformations were obtained from the lung bases to the pubic symphysis following the IV administration of 100 mL of Omnipaque 350 .  Oral contrast was not administered.     COMPARISON:  None.     FINDINGS:  Abdomen:     - Lower thorax:Unremarkable.     - Lung bases: No infiltrates and no nodules.     - Liver: Mild hepatomegaly.  Diffuse steatosis.     - Gallbladder: No calcified gallstones.     - Bile Ducts: No evidence of intra or extra hepatic biliary ductal dilation.     - Spleen: Negative.     - Kidneys: No mass or hydronephrosis.     - Adrenals: Unremarkable.     - Pancreas: No mass or peripancreatic fat stranding.     - Retroperitoneum:  No significant adenopathy.     - Vascular: No abdominal aortic aneurysm.     - Abdominal wall:  Unremarkable.     Pelvis:     Small volume free fluid in the pelvis and about the liver and spleen.  No fluid collections.  Bladder is empty.     Bowel/Mesentery:     Diffuse colonic wall thickening from the cecum through the sigmoid colon.  Diffuse mild wall thickening throughout the small bowel.  Mild fat stranding present about the bowel.  No bowel obstruction.   Normal appendix.     Bones:  No acute osseous abnormality and no suspicious lytic or blastic lesion.     Impression:     Diffuse mild wall thickening throughout the small bowel and colon, as above.  Mild associated fat stranding.  Small volume free fluid in the abdomen and pelvis.  No bowel obstruction.  Findings suggest nonspecific enterocolitis.  Correlate clinically.     Mild hepatomegaly with diffuse steatosis.        Electronically signed by:Tim Aguilera MD  Date:                                            08/11/2024  Time:                                           03:34           Exam Ended: 08/11/24 02:49 CDT Last Resulted: 08/11/24 03:34 CDT

## 2024-08-11 NOTE — CONSULTS
[facility]  Hematology/Oncology  Consult Note    Patient Name: Yogi Britt  MRN: [id number]  Admission Date: 8/11/2024  Hospital Length of Stay: 0 days  Code Status: Full Code   Attending Provider: Balaji Sheppard MD  Consulting Provider: Rosalio Li MD  Primary Care Physician: Kristy Prieto DO  Principal Problem:Shock    Consults  Subjective:     HPI:  Mr. Britt is a 27yo male with type 2 DM, bipolar disease and non-epileptic seizures presents with a cc of abrupt onset diffuse body numbness, itching/pruitis and blurry vision.  He called EMS immediately and was brought to the ED.  He was found to have a very abnormal CBC with a WBC of 32k and a Hb of 20g/dl.  He has been in the ICU overnight and getting IVFs.  He states the symptoms resolved in the ED and have not recurred at this time.  He did not take any new medications or supplements recently and has been on ozempic and losing wt. Because of this.  He works as an , does not smoke and drinks occasionally.  Denies any other drug use.      Peripheral Smear Review:  I reviewed the smear personally.   Notable increased WBC forms in multiple levels of development including rare blast, bands, metamyelocytes, basophils etc. Very increased RBC count.     Oncology Treatment Plan:   [No matching plan found]    Medications:  Continuous Infusions:   lactated ringers   Intravenous Continuous 150 mL/hr at 08/11/24 0625 New Bag at 08/11/24 0625     Scheduled Meds:   cetirizine  10 mg Oral Daily    enoxparin  40 mg Subcutaneous Daily    famotidine  20 mg Oral BID    [START ON 8/12/2024] methylPREDNISolone injection (PEDS and ADULTS)  125 mg Intravenous Daily    piperacillin-tazobactam (Zosyn) IV (PEDS and ADULTS) (extended infusion is not appropriate)  3.375 g Intravenous Q8H     PRN Meds:  Current Facility-Administered Medications:     dextrose 50%, 12.5 g, Intravenous, PRN    dextrose 50%, 25 g, Intravenous, PRN    glucagon (human  recombinant), 1 mg, Intramuscular, PRN    glucose, 16 g, Oral, PRN    glucose, 24 g, Oral, PRN    insulin aspart U-100, 0-5 Units, Subcutaneous, QID (AC + HS) PRN    magnesium oxide, 800 mg, Oral, PRN    magnesium oxide, 800 mg, Oral, PRN    naloxone, 0.02 mg, Intravenous, PRN    ondansetron, 4 mg, Intravenous, Q8H PRN    potassium bicarbonate, 35 mEq, Oral, PRN    potassium bicarbonate, 50 mEq, Oral, PRN    potassium bicarbonate, 60 mEq, Oral, PRN    potassium, sodium phosphates, 2 packet, Oral, PRN    potassium, sodium phosphates, 2 packet, Oral, PRN    potassium, sodium phosphates, 2 packet, Oral, PRN    sodium chloride 0.9%, 10 mL, Intravenous, Q12H PRN     Review of patient's allergies indicates:  No Known Allergies     Past Medical History:   Diagnosis Date    Anxiety     Seizures      No past surgical history on file.  Family History    None       Tobacco Use    Smoking status: Never    Smokeless tobacco: Never   Substance and Sexual Activity    Alcohol use: Not on file    Drug use: Not on file    Sexual activity: Not on file       Review of Systems   Constitutional:  Negative for fever and unexpected weight change.   HENT:  Negative for nosebleeds.    Respiratory:  Negative for chest tightness and shortness of breath.    Cardiovascular:  Negative for chest pain.   Gastrointestinal:  Negative for abdominal pain and blood in stool.   Genitourinary:  Negative for hematuria.   Skin:  Negative for rash.   Hematological:  Does not bruise/bleed easily.     Objective:     Vital Signs (Most Recent):  Temp: 98.6 °F (37 °C) (08/11/24 0730)  Pulse: 81 (08/11/24 0730)  Resp: 10 (08/11/24 0730)  BP: 104/61 (08/11/24 0730)  SpO2: 96 % (08/11/24 0730) Vital Signs (24h Range):  Temp:  [97.7 °F (36.5 °C)-98.6 °F (37 °C)] 98.6 °F (37 °C)  Pulse:  [] 81  Resp:  [10-19] 10  SpO2:  [95 %-100 %] 96 %  BP: ()/(40-75) 104/61     Weight: 104.3 kg (230 lb)  Body mass index is 31.19 kg/m².  Body surface area is 2.3 meters  squared.      Intake/Output Summary (Last 24 hours) at 8/11/2024 1009  Last data filed at 8/11/2024 0306  Gross per 24 hour   Intake 2000 ml   Output --   Net 2000 ml        Physical Exam  Constitutional:       Appearance: Normal appearance.   HENT:      Head: Normocephalic and atraumatic.   Eyes:      General: No scleral icterus.     Conjunctiva/sclera: Conjunctivae normal.   Cardiovascular:      Rate and Rhythm: Normal rate.   Pulmonary:      Effort: Pulmonary effort is normal.   Abdominal:      General: Abdomen is flat.   Neurological:      General: No focal deficit present.      Mental Status: He is alert and oriented to person, place, and time.   Psychiatric:         Mood and Affect: Mood normal.         Behavior: Behavior normal.          Significant Labs:   CBC:   Recent Labs   Lab 08/11/24  0127 08/11/24  0235   WBC 19.47* 32.80*   HGB 20.4* 20.4*   HCT 57.9* 60.2*    360    and CMP:   Recent Labs   Lab 08/11/24  0127      K 3.9      CO2 20*   *   BUN 18   CREATININE 1.0   CALCIUM 8.1*   PROT 5.7*   ALBUMIN 3.5   BILITOT 0.3   ALKPHOS 59   AST 22   ALT 23   ANIONGAP 10       Diagnostic Results:  None  Assessment/Plan:     Leucocytosis  I reviewed his peripheral smear in the lab which shows an overall pattern concerning for a proliferative states such has CML or polycythemia vera with elevated white cell line.  The smear dose show occasional blasts but there is no clear preponderance of such so I don't think this was causing his symptoms.  He does have a Hb of 20.4g/dl which is likely the cause of his presenting symptoms.  He has been getting IVFs overnight which I agree with and will check his count again now.  The best approach is to perform 500cc of phlebotomy and if this remains elevated, will have blood center do this.      I discussed the possibility of CML with him and will get flow cytometry and will arrange a bone marrow biopsy to be done which I feel is needed here.   Therapy options cannot be discussed at this point until the diagnosis is clearly known.  Will continue to follow him closely and as stated,  discussed all this with him in detail.          Thank you for your consult. I will follow-up with patient. Please contact us if you have any additional questions.    Rosalio Li MD  Hematology/Oncology  [facility]

## 2024-08-11 NOTE — HPI
28-year-old male with past medical history of anxiety, diabetes and elevated BMI on Ozempic that presents to the ED for evaluation of sudden onset generalized numbness, itching, blurry vision, weakness, abdominal pain, shortness of breath, nausea, diarrhea, and vomiting that started 30 minutes before presentation.  Patient discloses no cough, fever, or chills.  Patient has no dysuria or hematuria.  Patient was not sure if he had a rash. Patient discloses no past history of these symptoms.  Patient discloses last meal was tropical can fruits.  Patient can not estimate the time interval between eating the can fruits and onset of his symptoms.  Patient discloses past history of GI bleed and was found to have multiple ulcers in his intestine [facility]  Patient does not smoke but drinks occasionally.  Patient discloses his brother is allergy to strawberry.  Patient was brought by EMS, initially normotensive but became hypotensive.  Patient was noted to have skin rash.  Labs remarkable for elevated white blood cell, polycythemia, elevated blood glucose.  Chest x-ray showed no infiltrate.  CT abdomen showed diffuse mild wall thickening throughout the small bowel and colon, mild associated fat stranding, small volume free fluid in the abdomen and pelvis, mild hepatomegaly with diffuse steatosis.  In the ED patient received epinephrine injection, diphenhydramine, methylprednisolone, famotidine Zofran, to L of normal saline.  Patient was started on epinephrine drip and received empiric antibiotics with Zosyn.  Hospital medicine consulted for admission.

## 2024-08-11 NOTE — ASSESSMENT & PLAN NOTE
I reviewed his peripheral smear in the lab which shows an overall pattern concerning for a proliferative states such has CML or polycythemia vera with elevated white cell line.  The smear dose show occasional blasts but there is no clear preponderance of such so I don't think this was causing his symptoms.  He does have a Hb of 20.4g/dl which is likely the cause of his presenting symptoms.  He has been getting IVFs overnight which I agree with and will check his count again now.  The best approach is to perform 500cc of phlebotomy and if this remains elevated, will have blood center do this.      I discussed the possibility of CML with him and will get flow cytometry and will arrange a bone marrow biopsy to be done which I feel is needed here.  Therapy options cannot be discussed at this point until the diagnosis is clearly known.  Will continue to follow him closely and as stated,  discussed all this with him in detail.

## 2024-08-11 NOTE — PLAN OF CARE
Discharge orders and chart reviewed with no further post-acute discharge needs identified at this time.     Follow up instructions in AVS. Pt is discharging home, SO will transport.    At this time, patient is cleared for discharge from Case Management standpoint.       08/11/24 1045   Final Note   Assessment Type Final Discharge Note   Anticipated Discharge Disposition Home   What phone number can be called within the next 1-3 days to see how you are doing after discharge? [phone]   Post-Acute Status   Coverage [employer] [facility] [employer]   Discharge Delays None known at this time

## 2024-08-11 NOTE — PLAN OF CARE
Critical access hospital  Initial Discharge Assessment    Assessment completed at bedside with Pt, and all information on FaceSheet confirmed, including demographics, PCP, pharmacy and insurance. Pt has not addressed advance directives. He currently lives in [place] with his SO.  [facility] and [facility] was a month [facility]. [facility]. He denies any DME/HH/HD/Blood Thinners. SO will transport back home after discharge. He denies any recent hospitalizations. Plan for discharge is to return home. Case Management to continue to follow for discharge planning needs.        Primary Care Provider: Kristy Prieto DO    Admission Diagnosis: Anaphylactic shock [T78.2XXA]    Admission Date: 8/11/2024  Expected Discharge Date: 8/11/2024    Transition of Care Barriers: None    Payor: [employer] / Plan: [facility] OPTUM / Product Type: Government /     Extended Emergency Contact Information  Primary Emergency Contact: Kristy Dorman  Mobile Phone: [phone]  Relation: Significant other  Preferred language: English   needed? No  Secondary Emergency Contact: Kevin Britt  Address: [address] [place] [address] [id number] [place] [address]  Home Phone: [phone]  Mobile Phone: [phone]  Relation: Father    Discharge Plan A: Home with family  Discharge Plan B: Home      [id number] - [place] - [address] AT [facility] [address]  [address] [address]  Phone: [phone] Fax: [phone]      Initial Assessment (most recent)       Adult Discharge Assessment - 08/11/24 1036          Discharge Assessment    Assessment Type Discharge Planning Assessment     Confirmed/corrected address, phone number and insurance Yes     Confirmed Demographics Correct on Facesheet     Source of Information patient     When was your last doctors appointment? --   Last month    Does patient/caregiver  understand observation status Yes     Reason For Admission Anaphylactic shock     People in Home significant other     Do you expect to return to your current living situation? Yes     Do you have help at home or someone to help you manage your care at home? Yes     Who are your caregiver(s) and their phone number(s)? Kristy Dorman (Significant other)  [phone] (Mobile)     Prior to hospitilization cognitive status: Alert/Oriented     Current cognitive status: Alert/Oriented     Walking or Climbing Stairs Difficulty no     Dressing/Bathing Difficulty no     Home Accessibility wheelchair accessible     Home Layout Able to live on 1st floor     Equipment Currently Used at Home none     Readmission within 30 days? No     Patient currently being followed by outpatient case management? No     Do you currently have service(s) that help you manage your care at home? No     Do you take prescription medications? Yes     Do you have prescription coverage? Yes     Coverage [employer] [facility] [employer] -     Do you have any problems affording any of your prescribed medications? No     Is the patient taking medications as prescribed? yes     Who is going to help you get home at discharge? Kristy Dorman (Significant other)  [phone] (Mobile)     How do you get to doctors appointments? car, drives self     Are you on dialysis? No     Do you take coumadin? No     Discharge Plan A Home with family     Discharge Plan B Home     DME Needed Upon Discharge  none     Discharge Plan discussed with: Patient     Transition of Care Barriers None        Physical Activity    On average, how many days per week do you engage in moderate to strenuous exercise (like a brisk walk)? 3 days     On average, how many minutes do you engage in exercise at this level? 30 min        Financial Resource Strain    How hard is it for you to pay for the very basics like food, housing, medical care, and heating? Not hard at all        Housing  Stability    In the last 12 months, was there a time when you were not able to pay the mortgage or rent on time? No     At any time in the past 12 months, were you homeless or living in a shelter (including now)? No        Transportation Needs    Has the lack of transportation kept you from medical appointments, meetings, work or from getting things needed for daily living? No        Food Insecurity    Within the past 12 months, you worried that your food would run out before you got the money to buy more. Never true     Within the past 12 months, the food you bought just didn't last and you didn't have money to get more. Never true        Stress    Do you feel stress - tense, restless, nervous, or anxious, or unable to sleep at night because your mind is troubled all the time - these days? Not at all        Social Isolation    How often do you feel lonely or isolated from those around you?  Never        Alcohol Use    Q1: How often do you have a drink containing alcohol? 2-4 times a month     Q2: How many drinks containing alcohol do you have on a typical day when you are drinking? 3 or 4     Q3: How often do you have six or more drinks on one occasion? Less than monthly        Utilities    In the past 12 months has the electric, gas, oil, or water company threatened to shut off services in your home? No        Health Literacy    How often do you need to have someone help you when you read instructions, pamphlets, or other written material from your doctor or pharmacy? Never        OTHER    Name(s) of People in Home Kristy Dorman (Significant other)  [phone] (Mobile)

## 2024-08-11 NOTE — ASSESSMENT & PLAN NOTE
Anaphylactic shock versus septic shock vs preformed toxin ingestion vs mast cell disorder  Follow-up blood culture  Urinalysis with reflex culture  Check tryptase level  Continue empiric antibiotics with Zosyn  Continue Solu-Medrol for 1 more day  Continue famotidine and cetirizine  IV fluid lactated ringer  Epinephrine drip as needed to keep map above 65  Admit to ICU for close monitoring  Will need EpiPen on discharge and allergy referral

## 2024-08-11 NOTE — SUBJECTIVE & OBJECTIVE
Oncology Treatment Plan:   [No matching plan found]    Medications:  Continuous Infusions:   lactated ringers   Intravenous Continuous 150 mL/hr at 08/11/24 0625 New Bag at 08/11/24 0625     Scheduled Meds:   cetirizine  10 mg Oral Daily    enoxparin  40 mg Subcutaneous Daily    famotidine  20 mg Oral BID    [START ON 8/12/2024] methylPREDNISolone injection (PEDS and ADULTS)  125 mg Intravenous Daily    piperacillin-tazobactam (Zosyn) IV (PEDS and ADULTS) (extended infusion is not appropriate)  3.375 g Intravenous Q8H     PRN Meds:  Current Facility-Administered Medications:     dextrose 50%, 12.5 g, Intravenous, PRN    dextrose 50%, 25 g, Intravenous, PRN    glucagon (human recombinant), 1 mg, Intramuscular, PRN    glucose, 16 g, Oral, PRN    glucose, 24 g, Oral, PRN    insulin aspart U-100, 0-5 Units, Subcutaneous, QID (AC + HS) PRN    magnesium oxide, 800 mg, Oral, PRN    magnesium oxide, 800 mg, Oral, PRN    naloxone, 0.02 mg, Intravenous, PRN    ondansetron, 4 mg, Intravenous, Q8H PRN    potassium bicarbonate, 35 mEq, Oral, PRN    potassium bicarbonate, 50 mEq, Oral, PRN    potassium bicarbonate, 60 mEq, Oral, PRN    potassium, sodium phosphates, 2 packet, Oral, PRN    potassium, sodium phosphates, 2 packet, Oral, PRN    potassium, sodium phosphates, 2 packet, Oral, PRN    sodium chloride 0.9%, 10 mL, Intravenous, Q12H PRN     Review of patient's allergies indicates:  No Known Allergies     Past Medical History:   Diagnosis Date    Anxiety     Seizures      No past surgical history on file.  Family History    None       Tobacco Use    Smoking status: Never    Smokeless tobacco: Never   Substance and Sexual Activity    Alcohol use: Not on file    Drug use: Not on file    Sexual activity: Not on file       Review of Systems   Constitutional:  Negative for fever and unexpected weight change.   HENT:  Negative for nosebleeds.    Respiratory:  Negative for chest tightness and shortness of breath.    Cardiovascular:   Negative for chest pain.   Gastrointestinal:  Negative for abdominal pain and blood in stool.   Genitourinary:  Negative for hematuria.   Skin:  Negative for rash.   Hematological:  Does not bruise/bleed easily.     Objective:     Vital Signs (Most Recent):  Temp: 98.6 °F (37 °C) (08/11/24 0730)  Pulse: 81 (08/11/24 0730)  Resp: 10 (08/11/24 0730)  BP: 104/61 (08/11/24 0730)  SpO2: 96 % (08/11/24 0730) Vital Signs (24h Range):  Temp:  [97.7 °F (36.5 °C)-98.6 °F (37 °C)] 98.6 °F (37 °C)  Pulse:  [] 81  Resp:  [10-19] 10  SpO2:  [95 %-100 %] 96 %  BP: ()/(40-75) 104/61     Weight: 104.3 kg (230 lb)  Body mass index is 31.19 kg/m².  Body surface area is 2.3 meters squared.      Intake/Output Summary (Last 24 hours) at 8/11/2024 1009  Last data filed at 8/11/2024 0306  Gross per 24 hour   Intake 2000 ml   Output --   Net 2000 ml        Physical Exam  Constitutional:       Appearance: Normal appearance.   HENT:      Head: Normocephalic and atraumatic.   Eyes:      General: No scleral icterus.     Conjunctiva/sclera: Conjunctivae normal.   Cardiovascular:      Rate and Rhythm: Normal rate.   Pulmonary:      Effort: Pulmonary effort is normal.   Abdominal:      General: Abdomen is flat.   Neurological:      General: No focal deficit present.      Mental Status: He is alert and oriented to person, place, and time.   Psychiatric:         Mood and Affect: Mood normal.         Behavior: Behavior normal.          Significant Labs:   CBC:   Recent Labs   Lab 08/11/24  0127 08/11/24  0235   WBC 19.47* 32.80*   HGB 20.4* 20.4*   HCT 57.9* 60.2*    360    and CMP:   Recent Labs   Lab 08/11/24  0127      K 3.9      CO2 20*   *   BUN 18   CREATININE 1.0   CALCIUM 8.1*   PROT 5.7*   ALBUMIN 3.5   BILITOT 0.3   ALKPHOS 59   AST 22   ALT 23   ANIONGAP 10       Diagnostic Results:  None

## 2024-08-12 LAB
ALBUMIN SERPL BCP-MCNC: 3.5 G/DL (ref 3.5–5.2)
ALP SERPL-CCNC: 45 U/L (ref 55–135)
ALT SERPL W/O P-5'-P-CCNC: 17 U/L (ref 10–44)
ANION GAP SERPL CALC-SCNC: 5 MMOL/L (ref 8–16)
AST SERPL-CCNC: 9 U/L (ref 10–40)
BACTERIA #/AREA URNS HPF: NORMAL /HPF
BASOPHILS # BLD AUTO: 0.03 K/UL (ref 0–0.2)
BASOPHILS NFR BLD: 0.2 % (ref 0–1.9)
BILIRUB SERPL-MCNC: 0.3 MG/DL (ref 0.1–1)
BUN SERPL-MCNC: 14 MG/DL (ref 6–20)
CALCIUM SERPL-MCNC: 8.3 MG/DL (ref 8.7–10.5)
CHLORIDE SERPL-SCNC: 106 MMOL/L (ref 95–110)
CO2 SERPL-SCNC: 25 MMOL/L (ref 23–29)
CREAT SERPL-MCNC: 0.9 MG/DL (ref 0.5–1.4)
DIFFERENTIAL METHOD BLD: ABNORMAL
EOSINOPHIL # BLD AUTO: 0.1 K/UL (ref 0–0.5)
EOSINOPHIL NFR BLD: 0.4 % (ref 0–8)
ERYTHROCYTE [DISTWIDTH] IN BLOOD BY AUTOMATED COUNT: 12.5 % (ref 11.5–14.5)
EST. GFR  (NO RACE VARIABLE): >60 ML/MIN/1.73 M^2
GLUCOSE SERPL-MCNC: 158 MG/DL (ref 70–110)
GLUCOSE SERPL-MCNC: 168 MG/DL (ref 70–110)
GLUCOSE SERPL-MCNC: 182 MG/DL (ref 70–110)
GLUCOSE SERPL-MCNC: 255 MG/DL (ref 70–110)
GLUCOSE SERPL-MCNC: 276 MG/DL (ref 70–110)
HCT VFR BLD AUTO: 39.3 % (ref 40–54)
HGB BLD-MCNC: 13.3 G/DL (ref 14–18)
IMM GRANULOCYTES # BLD AUTO: 0.13 K/UL (ref 0–0.04)
IMM GRANULOCYTES NFR BLD AUTO: 0.8 % (ref 0–0.5)
LACTATE SERPL-SCNC: 1.6 MMOL/L (ref 0.5–1.9)
LYMPHOCYTES # BLD AUTO: 2.8 K/UL (ref 1–4.8)
LYMPHOCYTES NFR BLD: 17.1 % (ref 18–48)
MCH RBC QN AUTO: 29.4 PG (ref 27–31)
MCHC RBC AUTO-ENTMCNC: 33.8 G/DL (ref 32–36)
MCV RBC AUTO: 87 FL (ref 82–98)
MICROSCOPIC COMMENT: NORMAL
MONOCYTES # BLD AUTO: 1 K/UL (ref 0.3–1)
MONOCYTES NFR BLD: 5.9 % (ref 4–15)
NEUTROPHILS # BLD AUTO: 12.3 K/UL (ref 1.8–7.7)
NEUTROPHILS NFR BLD: 75.6 % (ref 38–73)
NRBC BLD-RTO: 0 /100 WBC
PLATELET # BLD AUTO: 237 K/UL (ref 150–450)
PMV BLD AUTO: 9.6 FL (ref 9.2–12.9)
POTASSIUM SERPL-SCNC: 4.2 MMOL/L (ref 3.5–5.1)
PROCALCITONIN SERPL IA-MCNC: 2.39 NG/ML (ref 0–0.5)
PROT SERPL-MCNC: 5.3 G/DL (ref 6–8.4)
RBC # BLD AUTO: 4.52 M/UL (ref 4.6–6.2)
SODIUM SERPL-SCNC: 136 MMOL/L (ref 136–145)
SQUAMOUS #/AREA URNS HPF: 0 /HPF
WBC # BLD AUTO: 16.33 K/UL (ref 3.9–12.7)
WBC #/AREA URNS HPF: 0 /HPF (ref 0–5)

## 2024-08-12 PROCEDURE — 80053 COMPREHEN METABOLIC PANEL: CPT | Performed by: STUDENT IN AN ORGANIZED HEALTH CARE EDUCATION/TRAINING PROGRAM

## 2024-08-12 PROCEDURE — 36415 COLL VENOUS BLD VENIPUNCTURE: CPT | Performed by: FAMILY MEDICINE

## 2024-08-12 PROCEDURE — 84145 PROCALCITONIN (PCT): CPT | Performed by: FAMILY MEDICINE

## 2024-08-12 PROCEDURE — 96372 THER/PROPH/DIAG INJ SC/IM: CPT | Performed by: FAMILY MEDICINE

## 2024-08-12 PROCEDURE — 25000003 PHARM REV CODE 250: Performed by: STUDENT IN AN ORGANIZED HEALTH CARE EDUCATION/TRAINING PROGRAM

## 2024-08-12 PROCEDURE — 85025 COMPLETE CBC W/AUTO DIFF WBC: CPT | Performed by: STUDENT IN AN ORGANIZED HEALTH CARE EDUCATION/TRAINING PROGRAM

## 2024-08-12 PROCEDURE — 63600175 PHARM REV CODE 636 W HCPCS: Performed by: STUDENT IN AN ORGANIZED HEALTH CARE EDUCATION/TRAINING PROGRAM

## 2024-08-12 PROCEDURE — 96361 HYDRATE IV INFUSION ADD-ON: CPT

## 2024-08-12 PROCEDURE — 83605 ASSAY OF LACTIC ACID: CPT | Performed by: STUDENT IN AN ORGANIZED HEALTH CARE EDUCATION/TRAINING PROGRAM

## 2024-08-12 PROCEDURE — 63600175 PHARM REV CODE 636 W HCPCS: Performed by: FAMILY MEDICINE

## 2024-08-12 PROCEDURE — 81001 URINALYSIS AUTO W/SCOPE: CPT | Performed by: FAMILY MEDICINE

## 2024-08-12 PROCEDURE — 12000002 HC ACUTE/MED SURGE SEMI-PRIVATE ROOM

## 2024-08-12 PROCEDURE — 96366 THER/PROPH/DIAG IV INF ADDON: CPT

## 2024-08-12 PROCEDURE — 99232 SBSQ HOSP IP/OBS MODERATE 35: CPT | Mod: ,,, | Performed by: INTERNAL MEDICINE

## 2024-08-12 RX ORDER — IBUPROFEN 200 MG
24 TABLET ORAL
Status: DISCONTINUED | OUTPATIENT
Start: 2024-08-12 | End: 2024-08-12

## 2024-08-12 RX ORDER — INSULIN ASPART 100 [IU]/ML
0-10 INJECTION, SOLUTION INTRAVENOUS; SUBCUTANEOUS
Status: DISCONTINUED | OUTPATIENT
Start: 2024-08-12 | End: 2024-08-13 | Stop reason: HOSPADM

## 2024-08-12 RX ORDER — IBUPROFEN 200 MG
16 TABLET ORAL
Status: DISCONTINUED | OUTPATIENT
Start: 2024-08-12 | End: 2024-08-12

## 2024-08-12 RX ORDER — GLUCAGON 1 MG
1 KIT INJECTION
Status: DISCONTINUED | OUTPATIENT
Start: 2024-08-12 | End: 2024-08-12

## 2024-08-12 RX ADMIN — SODIUM CHLORIDE, POTASSIUM CHLORIDE, SODIUM LACTATE AND CALCIUM CHLORIDE: 600; 310; 30; 20 INJECTION, SOLUTION INTRAVENOUS at 03:08

## 2024-08-12 RX ADMIN — CETIRIZINE HYDROCHLORIDE 10 MG: 5 TABLET ORAL at 08:08

## 2024-08-12 RX ADMIN — INSULIN ASPART 3 UNITS: 100 INJECTION, SOLUTION INTRAVENOUS; SUBCUTANEOUS at 08:08

## 2024-08-12 RX ADMIN — INSULIN ASPART 2 UNITS: 100 INJECTION, SOLUTION INTRAVENOUS; SUBCUTANEOUS at 05:08

## 2024-08-12 RX ADMIN — PIPERACILLIN SODIUM AND TAZOBACTAM SODIUM 3.38 G: 3; .375 INJECTION, POWDER, LYOPHILIZED, FOR SOLUTION INTRAVENOUS at 08:08

## 2024-08-12 RX ADMIN — PIPERACILLIN SODIUM AND TAZOBACTAM SODIUM 3.38 G: 3; .375 INJECTION, POWDER, LYOPHILIZED, FOR SOLUTION INTRAVENOUS at 12:08

## 2024-08-12 RX ADMIN — PIPERACILLIN SODIUM AND TAZOBACTAM SODIUM 3.38 G: 3; .375 INJECTION, POWDER, LYOPHILIZED, FOR SOLUTION INTRAVENOUS at 05:08

## 2024-08-12 RX ADMIN — FAMOTIDINE 20 MG: 20 TABLET ORAL at 08:08

## 2024-08-12 RX ADMIN — FAMOTIDINE 20 MG: 20 TABLET ORAL at 07:08

## 2024-08-12 RX ADMIN — ENOXAPARIN SODIUM 40 MG: 40 INJECTION SUBCUTANEOUS at 05:08

## 2024-08-12 RX ADMIN — INSULIN ASPART 2 UNITS: 100 INJECTION, SOLUTION INTRAVENOUS; SUBCUTANEOUS at 12:08

## 2024-08-12 RX ADMIN — SODIUM CHLORIDE, POTASSIUM CHLORIDE, SODIUM LACTATE AND CALCIUM CHLORIDE: 600; 310; 30; 20 INJECTION, SOLUTION INTRAVENOUS at 05:08

## 2024-08-12 NOTE — PROGRESS NOTES
[facility]  Hematology/Oncology  Progress Note    Patient Name: Yogi Britt  Admission Date: 8/11/2024  Hospital Length of Stay: 0 days  Code Status: Full Code     Subjective:     HPI:  Mr. Britt is feeling much better today.  No new complaints and he has not had any recurrence of his admitting symptoms.     Interval History:     Oncology Treatment Plan:   [No matching plan found]    Medications:  Continuous Infusions:   lactated ringers   Intravenous Continuous 150 mL/hr at 08/12/24 0315 New Bag at 08/12/24 0315     Scheduled Meds:   cetirizine  10 mg Oral Daily    enoxparin  40 mg Subcutaneous Daily    famotidine  20 mg Oral BID    piperacillin-tazobactam (Zosyn) IV (PEDS and ADULTS) (extended infusion is not appropriate)  3.375 g Intravenous Q8H     PRN Meds:  Current Facility-Administered Medications:     dextrose 50%, 12.5 g, Intravenous, PRN    dextrose 50%, 25 g, Intravenous, PRN    glucagon (human recombinant), 1 mg, Intramuscular, PRN    glucose, 16 g, Oral, PRN    glucose, 24 g, Oral, PRN    insulin aspart U-100, 0-10 Units, Subcutaneous, QID (AC + HS) PRN    magnesium oxide, 800 mg, Oral, PRN    magnesium oxide, 800 mg, Oral, PRN    naloxone, 0.02 mg, Intravenous, PRN    ondansetron, 4 mg, Intravenous, Q8H PRN    potassium bicarbonate, 35 mEq, Oral, PRN    potassium bicarbonate, 50 mEq, Oral, PRN    potassium bicarbonate, 60 mEq, Oral, PRN    potassium, sodium phosphates, 2 packet, Oral, PRN    potassium, sodium phosphates, 2 packet, Oral, PRN    potassium, sodium phosphates, 2 packet, Oral, PRN    sodium chloride 0.9%, 10 mL, Intravenous, Q12H PRN       Objective:     Vital Signs (Most Recent):  Temp: 98.2 °F (36.8 °C) (08/12/24 1612)  Pulse: 83 (08/12/24 1612)  Resp: 18 (08/12/24 1612)  BP: 127/73 (08/12/24 1612)  SpO2: 98 % (08/12/24 1612) Vital Signs (24h Range):  Temp:  [97.6 °F (36.4 °C)-98.3 °F (36.8 °C)] 98.2 °F (36.8 °C)  Pulse:  [] 83  Resp:  [14-41] 18  SpO2:  [97  %-100 %] 98 %  BP: (113-137)/(6-93) 127/73     Weight: 104.3 kg (230 lb)  Body mass index is 31.19 kg/m².  Body surface area is 2.3 meters squared.      Intake/Output Summary (Last 24 hours) at 8/12/2024 1634  Last data filed at 8/12/2024 1308  Gross per 24 hour   Intake 480 ml   Output --   Net 480 ml        Physical Exam  Constitutional:       Appearance: Normal appearance.   HENT:      Head: Normocephalic and atraumatic.   Eyes:      General: No scleral icterus.     Conjunctiva/sclera: Conjunctivae normal.   Cardiovascular:      Rate and Rhythm: Normal rate.   Pulmonary:      Effort: Pulmonary effort is normal.   Abdominal:      General: Abdomen is flat.   Neurological:      General: No focal deficit present.      Mental Status: He is alert and oriented to person, place, and time.   Psychiatric:         Mood and Affect: Mood normal.         Behavior: Behavior normal.          Significant Labs:   CBC:   Recent Labs   Lab 08/11/24  0235 08/11/24  0934 08/12/24  0523   WBC 32.80* 20.22* 16.33*   HGB 20.4* 18.4* 13.3*   HCT 60.2* 53.7 39.3*    290 237    and CMP:   Recent Labs   Lab 08/11/24  0127 08/12/24  0523    136   K 3.9 4.2    106   CO2 20* 25   * 276*   BUN 18 14   CREATININE 1.0 0.9   CALCIUM 8.1* 8.3*   PROT 5.7* 5.3*   ALBUMIN 3.5 3.5   BILITOT 0.3 0.3   ALKPHOS 59 45*   AST 22 9*   ALT 23 17   ANIONGAP 10 5*       Diagnostic Results:  None  Assessment/Plan:     Leucocytosis  Patient is feeling much better and his counts have improved greatly.  This makes me much less suspicious of malignancy but still awaiting flow cytometry.  I don't think a bone marrow is needed yet and from a heme standpoint, I think he can go home.  I will be following him up as an OP and discussed this with him today.  Would like to see him in the next 2 weeks.         Thank you for your consult. I will follow-up with patient. Please contact us if you have any additional questions.     Rosalio Li,  MD  Hematology/Oncology  [facility]

## 2024-08-12 NOTE — HOSPITAL COURSE
28-year-old male with past medical history of anxiety, diabetes and elevated BMI on Ozempic that presents to the ED for evaluation of sudden onset generalized numbness, itching, blurry vision, weakness, abdominal pain, shortness of breath, nausea, diarrhea, and vomiting after eating tropical can fruits.  Patient had anaphylactic shock and he was treated.  Oncology was also consulted for polycythemia and leukocytosis.  Oncology recommended outpatient follow-up and did not recommend bone biopsy ( concern for CML at admission) since both values trended down following day likely from allergic reaction.  Patient was also treated with antibiotic for enterocolitis and discharged home with Augmentin and recommended outpaitent follow up with Heme/Oncology.

## 2024-08-12 NOTE — ASSESSMENT & PLAN NOTE
H&H is trending down  Oncology was consulted for polycythemia at admission  Oncology recommended outpatient follow-up and did not recommend bone biopsy ( concern for CML at admission) since likely an allergic reaction

## 2024-08-12 NOTE — SUBJECTIVE & OBJECTIVE
Interval History: patient said he feels much better today    Review of Systems   All other systems reviewed and are negative.    Objective:     Vital Signs (Most Recent):  Temp: 98.2 °F (36.8 °C) (08/12/24 1129)  Pulse: 82 (08/12/24 1129)  Resp: 18 (08/12/24 1129)  BP: 117/71 (08/12/24 1129)  SpO2: 100 % (08/12/24 1129) Vital Signs (24h Range):  Temp:  [97.6 °F (36.4 °C)-98.3 °F (36.8 °C)] 98.2 °F (36.8 °C)  Pulse:  [] 82  Resp:  [14-41] 18  SpO2:  [95 %-100 %] 100 %  BP: (113-137)/(6-93) 117/71     Weight: 104.3 kg (230 lb)  Body mass index is 31.19 kg/m².    Intake/Output Summary (Last 24 hours) at 8/12/2024 1526  Last data filed at 8/12/2024 1308  Gross per 24 hour   Intake 480 ml   Output --   Net 480 ml         Physical Exam  Cardiovascular:      Rate and Rhythm: Normal rate.      Pulses: Normal pulses.   Pulmonary:      Effort: Pulmonary effort is normal.   Abdominal:      General: There is no distension.   Neurological:      Mental Status: He is alert and oriented to person, place, and time.             Significant Labs: All pertinent labs within the past 24 hours have been reviewed.    Significant Imaging: I have reviewed all pertinent imaging results/findings within the past 24 hours.

## 2024-08-12 NOTE — PROGRESS NOTES
[facility] Medicine  Progress Note    Patient Name: Yogi Britt  MRN: [id number]  Patient Class: IP- Inpatient   Admission Date: 8/11/2024  Length of Stay: 0 days  Attending Physician: Zenon Farrar DO  Primary Care Provider: Kristy Prieto DO        Subjective:     Principal Problem:Shock        HPI:  28-year-old male with past medical history of anxiety, diabetes and elevated BMI on Ozempic that presents to the ED for evaluation of sudden onset generalized numbness, itching, blurry vision, weakness, abdominal pain, shortness of breath, nausea, diarrhea, and vomiting that started 30 minutes before presentation.  Patient discloses no cough, fever, or chills.  Patient has no dysuria or hematuria.  Patient was not sure if he had a rash. Patient discloses no past history of these symptoms.  Patient discloses last meal was tropical can fruits.  Patient can not estimate the time interval between eating the can fruits and onset of his symptoms.  Patient discloses past history of GI bleed and was found to have multiple ulcers in his intestine and [facility]  Patient does not smoke but drinks occasionally.  Patient discloses his brother is allergy to strawberry.  Patient was brought by EMS, initially normotensive but became hypotensive.  Patient was noted to have skin rash.  Labs remarkable for elevated white blood cell, polycythemia, elevated blood glucose.  Chest x-ray showed no infiltrate.  CT abdomen showed diffuse mild wall thickening throughout the small bowel and colon, mild associated fat stranding, small volume free fluid in the abdomen and pelvis, mild hepatomegaly with diffuse steatosis.  In the ED patient received epinephrine injection, diphenhydramine, methylprednisolone, famotidine Zofran, to L of normal saline.  Patient was started on epinephrine drip and received empiric antibiotics with Zosyn.  Hospital medicine consulted for  admission.    Overview/Hospital Course:  28-year-old male with past medical history of anxiety, diabetes and elevated BMI on Ozempic that presents to the ED for evaluation of sudden onset generalized numbness, itching, blurry vision, weakness, abdominal pain, shortness of breath, nausea, diarrhea, and vomiting after eating tropical can fruits.  Patient had anaphylactic shock and he was treated.  Oncology was also consulted for polycythemia and leukocytosis.  Oncology recommended outpatient follow-up and did not recommend bone biopsy ( concern for CML at admission) since both values trended down following day likely from allergic reaction.  Patient was also treated with antibiotic for enterocolitis    Interval History: patient said he feels much better today    Review of Systems   All other systems reviewed and are negative.    Objective:     Vital Signs (Most Recent):  Temp: 98.2 °F (36.8 °C) (08/12/24 1129)  Pulse: 82 (08/12/24 1129)  Resp: 18 (08/12/24 1129)  BP: 117/71 (08/12/24 1129)  SpO2: 100 % (08/12/24 1129) Vital Signs (24h Range):  Temp:  [97.6 °F (36.4 °C)-98.3 °F (36.8 °C)] 98.2 °F (36.8 °C)  Pulse:  [] 82  Resp:  [14-41] 18  SpO2:  [95 %-100 %] 100 %  BP: (113-137)/(6-93) 117/71     Weight: 104.3 kg (230 lb)  Body mass index is 31.19 kg/m².    Intake/Output Summary (Last 24 hours) at 8/12/2024 1526  Last data filed at 8/12/2024 1308  Gross per 24 hour   Intake 480 ml   Output --   Net 480 ml         Physical Exam  Cardiovascular:      Rate and Rhythm: Normal rate.      Pulses: Normal pulses.   Pulmonary:      Effort: Pulmonary effort is normal.   Abdominal:      General: There is no distension.   Neurological:      Mental Status: He is alert and oriented to person, place, and time.             Significant Labs: All pertinent labs within the past 24 hours have been reviewed.    Significant Imaging: I have reviewed all pertinent imaging results/findings within the past 24 hours.    Assessment/Plan:       * Shock  Has been resolved  Likely from allergic reaction to fruit      Leucocytosis  Trending down  Likely multifactorial including allergic reaction, enterocolitis  Continue with antibiotics      Polycythemia  H&H is trending down  Oncology was consulted for polycythemia at admission  Oncology recommended outpatient follow-up and did not recommend bone biopsy ( concern for CML at admission) since likely an allergic reaction        VTE Risk Mitigation (From admission, onward)           Ordered     enoxaparin injection 40 mg  Daily         08/11/24 0519     IP VTE HIGH RISK PATIENT  Once         08/11/24 0519     Place sequential compression device  Until discontinued         08/11/24 0519                    Discharge Planning   CHEY: 8/13/2024     Code Status: Full Code   Is the patient medically ready for discharge?:     Reason for patient still in hospital (select all that apply): Patient trending condition  Discharge Plan A: Home with family   Discharge Delays: None known at this time              Zenon Farrar DO  Department of Hospital Medicine   [facility]

## 2024-08-12 NOTE — ASSESSMENT & PLAN NOTE
Patient is feeling much better and his counts have improved greatly.  This makes me much less suspicious of malignancy but still awaiting flow cytometry.  I don't think a bone marrow is needed yet and from a heme standpoint, I think he can go home.  I will be following him up as an OP and discussed this with him today.  Would like to see him in the next 2 weeks.

## 2024-08-12 NOTE — NURSING
Nurses Note -- 4 Eyes      8/11/2024   9:31 PM      Skin assessed during: Transfer      [x] No Altered Skin Integrity Present    []Prevention Measures Documented      [] Yes- Altered Skin Integrity Present or Discovered   [] LDA Added if Not in Epic (Describe Wound)   [] New Altered Skin Integrity was Present on Admit and Documented in LDA   [] Wound Image Taken    Wound Care Consulted? No    Attending Nurse:  Danny Santana RN/Staff Member: Antoinette Sharif RN

## 2024-08-12 NOTE — ASSESSMENT & PLAN NOTE
Trending down  Likely multifactorial including allergic reaction, enterocolitis  Continue with antibiotics

## 2024-08-12 NOTE — SUBJECTIVE & OBJECTIVE
Interval History:     Oncology Treatment Plan:   [No matching plan found]    Medications:  Continuous Infusions:   lactated ringers   Intravenous Continuous 150 mL/hr at 08/12/24 0315 New Bag at 08/12/24 0315     Scheduled Meds:   cetirizine  10 mg Oral Daily    enoxparin  40 mg Subcutaneous Daily    famotidine  20 mg Oral BID    piperacillin-tazobactam (Zosyn) IV (PEDS and ADULTS) (extended infusion is not appropriate)  3.375 g Intravenous Q8H     PRN Meds:  Current Facility-Administered Medications:     dextrose 50%, 12.5 g, Intravenous, PRN    dextrose 50%, 25 g, Intravenous, PRN    glucagon (human recombinant), 1 mg, Intramuscular, PRN    glucose, 16 g, Oral, PRN    glucose, 24 g, Oral, PRN    insulin aspart U-100, 0-10 Units, Subcutaneous, QID (AC + HS) PRN    magnesium oxide, 800 mg, Oral, PRN    magnesium oxide, 800 mg, Oral, PRN    naloxone, 0.02 mg, Intravenous, PRN    ondansetron, 4 mg, Intravenous, Q8H PRN    potassium bicarbonate, 35 mEq, Oral, PRN    potassium bicarbonate, 50 mEq, Oral, PRN    potassium bicarbonate, 60 mEq, Oral, PRN    potassium, sodium phosphates, 2 packet, Oral, PRN    potassium, sodium phosphates, 2 packet, Oral, PRN    potassium, sodium phosphates, 2 packet, Oral, PRN    sodium chloride 0.9%, 10 mL, Intravenous, Q12H PRN       Objective:     Vital Signs (Most Recent):  Temp: 98.2 °F (36.8 °C) (08/12/24 1612)  Pulse: 83 (08/12/24 1612)  Resp: 18 (08/12/24 1612)  BP: 127/73 (08/12/24 1612)  SpO2: 98 % (08/12/24 1612) Vital Signs (24h Range):  Temp:  [97.6 °F (36.4 °C)-98.3 °F (36.8 °C)] 98.2 °F (36.8 °C)  Pulse:  [] 83  Resp:  [14-41] 18  SpO2:  [97 %-100 %] 98 %  BP: (113-137)/(6-93) 127/73     Weight: 104.3 kg (230 lb)  Body mass index is 31.19 kg/m².  Body surface area is 2.3 meters squared.      Intake/Output Summary (Last 24 hours) at 8/12/2024 1634  Last data filed at 8/12/2024 1308  Gross per 24 hour   Intake 480 ml   Output --   Net 480 ml        Physical  Exam  Constitutional:       Appearance: Normal appearance.   HENT:      Head: Normocephalic and atraumatic.   Eyes:      General: No scleral icterus.     Conjunctiva/sclera: Conjunctivae normal.   Cardiovascular:      Rate and Rhythm: Normal rate.   Pulmonary:      Effort: Pulmonary effort is normal.   Abdominal:      General: Abdomen is flat.   Neurological:      General: No focal deficit present.      Mental Status: He is alert and oriented to person, place, and time.   Psychiatric:         Mood and Affect: Mood normal.         Behavior: Behavior normal.          Significant Labs:   CBC:   Recent Labs   Lab 08/11/24  0235 08/11/24  0934 08/12/24  0523   WBC 32.80* 20.22* 16.33*   HGB 20.4* 18.4* 13.3*   HCT 60.2* 53.7 39.3*    290 237    and CMP:   Recent Labs   Lab 08/11/24  0127 08/12/24  0523    136   K 3.9 4.2    106   CO2 20* 25   * 276*   BUN 18 14   CREATININE 1.0 0.9   CALCIUM 8.1* 8.3*   PROT 5.7* 5.3*   ALBUMIN 3.5 3.5   BILITOT 0.3 0.3   ALKPHOS 59 45*   AST 22 9*   ALT 23 17   ANIONGAP 10 5*       Diagnostic Results:  None

## 2024-08-13 VITALS
WEIGHT: 230 LBS | DIASTOLIC BLOOD PRESSURE: 81 MMHG | SYSTOLIC BLOOD PRESSURE: 125 MMHG | HEIGHT: 72 IN | BODY MASS INDEX: 31.15 KG/M2 | TEMPERATURE: 98 F | RESPIRATION RATE: 18 BRPM | OXYGEN SATURATION: 97 % | HEART RATE: 69 BPM

## 2024-08-13 LAB
ALBUMIN SERPL BCP-MCNC: 3.7 G/DL (ref 3.5–5.2)
ALP SERPL-CCNC: 42 U/L (ref 55–135)
ALT SERPL W/O P-5'-P-CCNC: 17 U/L (ref 10–44)
ANION GAP SERPL CALC-SCNC: 8 MMOL/L (ref 8–16)
AST SERPL-CCNC: 10 U/L (ref 10–40)
BASOPHILS # BLD AUTO: 0.04 K/UL (ref 0–0.2)
BASOPHILS NFR BLD: 0.4 % (ref 0–1.9)
BILIRUB SERPL-MCNC: 0.4 MG/DL (ref 0.1–1)
BUN SERPL-MCNC: 8 MG/DL (ref 6–20)
CALCIUM SERPL-MCNC: 8.9 MG/DL (ref 8.7–10.5)
CHLORIDE SERPL-SCNC: 107 MMOL/L (ref 95–110)
CO2 SERPL-SCNC: 28 MMOL/L (ref 23–29)
CREAT SERPL-MCNC: 0.9 MG/DL (ref 0.5–1.4)
DIFFERENTIAL METHOD BLD: ABNORMAL
EOSINOPHIL # BLD AUTO: 0.2 K/UL (ref 0–0.5)
EOSINOPHIL NFR BLD: 1.8 % (ref 0–8)
ERYTHROCYTE [DISTWIDTH] IN BLOOD BY AUTOMATED COUNT: 12.7 % (ref 11.5–14.5)
EST. GFR  (NO RACE VARIABLE): >60 ML/MIN/1.73 M^2
GLUCOSE SERPL-MCNC: 115 MG/DL (ref 70–110)
GLUCOSE SERPL-MCNC: 125 MG/DL (ref 70–110)
GLUCOSE SERPL-MCNC: 126 MG/DL (ref 70–110)
HBA1C MFR BLD: 7.7 % (ref 4.8–5.6)
HCT VFR BLD AUTO: 39.6 % (ref 40–54)
HGB BLD-MCNC: 13.1 G/DL (ref 14–18)
IMM GRANULOCYTES # BLD AUTO: 0.05 K/UL (ref 0–0.04)
IMM GRANULOCYTES NFR BLD AUTO: 0.5 % (ref 0–0.5)
LYMPHOCYTES # BLD AUTO: 4.2 K/UL (ref 1–4.8)
LYMPHOCYTES NFR BLD: 43.4 % (ref 18–48)
MCH RBC QN AUTO: 29.6 PG (ref 27–31)
MCHC RBC AUTO-ENTMCNC: 33.1 G/DL (ref 32–36)
MCV RBC AUTO: 90 FL (ref 82–98)
MONOCYTES # BLD AUTO: 0.5 K/UL (ref 0.3–1)
MONOCYTES NFR BLD: 5.3 % (ref 4–15)
NEUTROPHILS # BLD AUTO: 4.7 K/UL (ref 1.8–7.7)
NEUTROPHILS NFR BLD: 48.6 % (ref 38–73)
NRBC BLD-RTO: 0 /100 WBC
OHS QRS DURATION: 88 MS
OHS QTC CALCULATION: 423 MS
PLATELET # BLD AUTO: 197 K/UL (ref 150–450)
PMV BLD AUTO: 9.9 FL (ref 9.2–12.9)
POTASSIUM SERPL-SCNC: 4 MMOL/L (ref 3.5–5.1)
PROT SERPL-MCNC: 5.6 G/DL (ref 6–8.4)
RBC # BLD AUTO: 4.42 M/UL (ref 4.6–6.2)
SODIUM SERPL-SCNC: 143 MMOL/L (ref 136–145)
WBC # BLD AUTO: 9.75 K/UL (ref 3.9–12.7)

## 2024-08-13 PROCEDURE — 85025 COMPLETE CBC W/AUTO DIFF WBC: CPT | Performed by: STUDENT IN AN ORGANIZED HEALTH CARE EDUCATION/TRAINING PROGRAM

## 2024-08-13 PROCEDURE — 63600175 PHARM REV CODE 636 W HCPCS: Performed by: STUDENT IN AN ORGANIZED HEALTH CARE EDUCATION/TRAINING PROGRAM

## 2024-08-13 PROCEDURE — 80053 COMPREHEN METABOLIC PANEL: CPT | Performed by: STUDENT IN AN ORGANIZED HEALTH CARE EDUCATION/TRAINING PROGRAM

## 2024-08-13 PROCEDURE — 36415 COLL VENOUS BLD VENIPUNCTURE: CPT | Performed by: STUDENT IN AN ORGANIZED HEALTH CARE EDUCATION/TRAINING PROGRAM

## 2024-08-13 PROCEDURE — 25000003 PHARM REV CODE 250: Performed by: STUDENT IN AN ORGANIZED HEALTH CARE EDUCATION/TRAINING PROGRAM

## 2024-08-13 RX ORDER — AMOXICILLIN AND CLAVULANATE POTASSIUM 875; 125 MG/1; MG/1
1 TABLET, FILM COATED ORAL EVERY 12 HOURS
Qty: 14 TABLET | Refills: 0 | Status: SHIPPED | OUTPATIENT
Start: 2024-08-13 | End: 2024-08-20

## 2024-08-13 RX ADMIN — PIPERACILLIN SODIUM AND TAZOBACTAM SODIUM 3.38 G: 3; .375 INJECTION, POWDER, LYOPHILIZED, FOR SOLUTION INTRAVENOUS at 12:08

## 2024-08-13 RX ADMIN — PIPERACILLIN SODIUM AND TAZOBACTAM SODIUM 3.38 G: 3; .375 INJECTION, POWDER, LYOPHILIZED, FOR SOLUTION INTRAVENOUS at 09:08

## 2024-08-13 RX ADMIN — CETIRIZINE HYDROCHLORIDE 10 MG: 5 TABLET ORAL at 09:08

## 2024-08-13 RX ADMIN — FAMOTIDINE 20 MG: 20 TABLET ORAL at 09:08

## 2024-08-13 NOTE — DISCHARGE SUMMARY
[facility] Medicine  Discharge Summary      Patient Name: Yogi Britt  MRN: [id number]  TEMO: [id number]  Patient Class: IP- Inpatient  Admission Date: 8/11/2024  Hospital Length of Stay: 1 days  Discharge Date and Time:  08/13/2024 4:15 PM  Attending Physician: Myra att. providers found   Discharging Provider: Zenon Farrar DO  Primary Care Provider: Kristy Prieto DO    Primary Care Team: Networked reference to record PCT     HPI:   28-year-old male with past medical history of anxiety, diabetes and elevated BMI on Ozempic that presents to the ED for evaluation of sudden onset generalized numbness, itching, blurry vision, weakness, abdominal pain, shortness of breath, nausea, diarrhea, and vomiting that started 30 minutes before presentation.  Patient discloses no cough, fever, or chills.  Patient has no dysuria or hematuria.  Patient was not sure if he had a rash. Patient discloses no past history of these symptoms.  Patient discloses last meal was tropical can fruits.  Patient can not estimate the time interval between eating the can fruits and onset of his symptoms.  Patient discloses past history of GI bleed and was found to have multiple ulcers in his intestine and [facility].  Patient does not smoke but drinks occasionally.  Patient discloses his brother is allergy to strawberry.  Patient was brought by EMS, initially normotensive but became hypotensive.  Patient was noted to have skin rash.  Labs remarkable for elevated white blood cell, polycythemia, elevated blood glucose.  Chest x-ray showed no infiltrate.  CT abdomen showed diffuse mild wall thickening throughout the small bowel and colon, mild associated fat stranding, small volume free fluid in the abdomen and pelvis, mild hepatomegaly with diffuse steatosis.  In the ED patient received epinephrine injection, diphenhydramine, methylprednisolone, famotidine Zofran, to L of normal saline.  Patient  was started on epinephrine drip and received empiric antibiotics with Zosyn.  Hospital medicine consulted for admission.    * No surgery found *      Hospital Course:   28-year-old male with past medical history of anxiety, diabetes and elevated BMI on Ozempic that presents to the ED for evaluation of sudden onset generalized numbness, itching, blurry vision, weakness, abdominal pain, shortness of breath, nausea, diarrhea, and vomiting after eating tropical can fruits.  Patient had anaphylactic shock and he was treated.  Oncology was also consulted for polycythemia and leukocytosis.  Oncology recommended outpatient follow-up and did not recommend bone biopsy ( concern for CML at admission) since both values trended down following day likely from allergic reaction.  Patient was also treated with antibiotic for enterocolitis and discharged home with Augmentin and recommended outpaitent follow up with Heme/Oncology.     Goals of Care Treatment Preferences:  Code Status: Full Code      SDOH Screening:  The patient was screened for utility difficulties, food insecurity, transport difficulties, housing insecurity, and interpersonal safety and there were no concerns identified this admission.     Consults:   Consults (From admission, onward)          Status Ordering Provider     Inpatient consult to Hematology Oncology  Once        Provider:  Rosalio Portillo MD    Acknowledged ESME ORNELAS            No new Assessment & Plan notes have been filed under this hospital service since the last note was generated.  Service: Hospital Medicine    Final Active Diagnoses:    Diagnosis Date Noted POA    PRINCIPAL PROBLEM:  Shock [R57.9] 08/11/2024 Yes    Polycythemia [D75.1] 08/11/2024 Yes    Leucocytosis [D72.829] 08/11/2024 Yes      Problems Resolved During this Admission:    Diagnosis Date Noted Date Resolved POA    Enterocolitis [K52.9] 08/11/2024 08/11/2024 Yes       Discharged Condition: fair    Disposition: Home or  Self Care    Follow Up:   Follow-up Information       [facility]. Schedule an appointment as soon as possible for a visit.    Why: [facility] and [facility].  Contact information:  Rosalio Lyn MD Follow up on 8/30/2024.    Specialties: Hematology, Oncology, Hematology and Oncology  Why: @ 10:30 am  Contact information:  [address] [facility]  [address]  [facility] [place] [address]  [phone]                           Patient Instructions:      Comprehensive metabolic panel   Standing Status: Future Standing Exp. Date: 11/11/25     Order Specific Question Answer Comments   Send normal result to authorizing provider's In Basket if patient is active on MyChart: Yes      CBC auto differential   Standing Status: Future Standing Exp. Date: 11/11/25     Order Specific Question Answer Comments   Send normal result to authorizing provider's In Basket if patient is active on MyChart: Yes        Significant Diagnostic Studies: N/A    Pending Diagnostic Studies:       Procedure Component Value Units Date/Time    EKG 12-lead [[id number]] Collected: 08/13/24 1320    Order Status: Sent Lab Status: In process Updated: 08/13/24 1331     QRS Duration 88 ms      OHS QTC Calculation 423 ms     Narrative:      Test Reason : R07.9,    Vent. Rate : 070 BPM     Atrial Rate : 070 BPM     P-R Int : 130 ms          QRS Dur : 088 ms      QT Int : 392 ms       P-R-T Axes : 034 074 057 degrees     QTc Int : 423 ms    Normal sinus rhythm  Normal ECG  When compared with ECG of 11-AUG-2024 01:14,  No significant change was found    Referred By: [email or web address]   SELF           Confirmed By:     Flow Cytometry Analysis (Peripheral Blood) [[id number]]     Order Status: Sent Lab Status: No result     Specimen: Blood     JAK2 Exon 12 And Other Non-V617 Mutation Detection, Bld [[id number]] Collected: 08/11/24 0934    Order Status: Sent Lab Status: In process Updated: 08/11/24 1127    Specimen:  Blood            Medications:  None    Indwelling Lines/Drains at time of discharge:   Lines/Drains/Airways       None                   Time spent on the discharge of patient: 32 minutes         Zenon Farrar DO  Department of Hospital Medicine  [facility]

## 2024-08-13 NOTE — PROGRESS NOTES
Discharge instructions given to pt. Pt verbalized understanding. PIVs removed. Pt leaving with personal belongings. Meds to be picked up from pharmacy. Pt leaving with family.

## 2024-08-13 NOTE — PLAN OF CARE
Follow up with hematology scheduled for 8/30/24, patient states he has a colonoscopy scheduled for that day, in basket message sent to office requesting appointment be rescheduled.

## 2024-08-14 LAB — EPO SERPL-ACNC: 1.2 MIU/ML (ref 2.6–18.5)

## 2024-08-15 LAB — TRYPTASE SERPL-MCNC: 29.2 UG/L (ref 2.2–13.2)

## 2024-08-16 LAB — BACTERIA BLD CULT: NORMAL

## 2024-09-05 ENCOUNTER — DOCUMENTATION ONLY (OUTPATIENT)
Facility: CLINIC | Age: 29
End: 2024-09-05

## 2024-09-05 NOTE — PROGRESS NOTES
Patient on schedule for hospital follow up. Flow Cytometry was not ran and patient does not [facility] auth [facility]. Discussed will get auth repeat labs and reschedule his appt with Dr. Lr. Patient is feeling well today. Some right hand itching.

## 2025-04-14 ENCOUNTER — HOSPITAL ENCOUNTER (INPATIENT)
Facility: HOSPITAL | Age: 30
LOS: 1 days | Discharge: HOME | DRG: 392 | End: 2025-04-15
Attending: EMERGENCY MEDICINE | Admitting: INTERNAL MEDICINE
Payer: OTHER GOVERNMENT

## 2025-04-14 DIAGNOSIS — R07.9 CHEST PAIN: ICD-10-CM

## 2025-04-14 DIAGNOSIS — R11.10 ABDOMINAL PAIN, VOMITING, AND DIARRHEA: ICD-10-CM

## 2025-04-14 DIAGNOSIS — D75.1 POLYCYTHEMIA: Primary | ICD-10-CM

## 2025-04-14 DIAGNOSIS — Z91.89 AT RISK FOR LONG QT SYNDROME: ICD-10-CM

## 2025-04-14 DIAGNOSIS — R10.9 ABDOMINAL PAIN, VOMITING, AND DIARRHEA: ICD-10-CM

## 2025-04-14 DIAGNOSIS — R19.7 ABDOMINAL PAIN, VOMITING, AND DIARRHEA: ICD-10-CM

## 2025-04-14 PROBLEM — E11.9 DIABETES MELLITUS: Status: ACTIVE | Noted: 2025-04-14

## 2025-04-14 LAB
ABSOLUTE EOSINOPHIL (SMH): 0.06 K/UL
ABSOLUTE MONOCYTE (SMH): 1.39 K/UL (ref 0.3–1)
ABSOLUTE NEUTROPHIL COUNT (SMH): 20.3 K/UL (ref 1.8–7.7)
ALBUMIN SERPL-MCNC: 5.2 G/DL (ref 3.5–5.2)
ALP SERPL-CCNC: 69 UNIT/L (ref 55–135)
ALT SERPL-CCNC: 17 UNIT/L (ref 10–44)
ANION GAP (SMH): 10 MMOL/L (ref 8–16)
APTT PPP: 27 SECONDS (ref 21–32)
AST SERPL-CCNC: 16 UNIT/L (ref 10–40)
BASOPHILS # BLD AUTO: 0.12 K/UL
BASOPHILS NFR BLD AUTO: 0.5 %
BILIRUB SERPL-MCNC: 0.5 MG/DL (ref 0.1–1)
BUN SERPL-MCNC: 18 MG/DL (ref 6–20)
CALCIUM SERPL-MCNC: 10.5 MG/DL (ref 8.7–10.5)
CHLORIDE SERPL-SCNC: 101 MMOL/L (ref 95–110)
CK SERPL-CCNC: 62 U/L (ref 20–200)
CO2 SERPL-SCNC: 29 MMOL/L (ref 23–29)
CREAT SERPL-MCNC: 1.1 MG/DL (ref 0.5–1.4)
ERYTHROCYTE [DISTWIDTH] IN BLOOD BY AUTOMATED COUNT: 13 % (ref 11.5–14.5)
FIBRINOGEN PPP-MCNC: 184 MG/DL (ref 182–400)
GFR SERPLBLD CREATININE-BSD FMLA CKD-EPI: >60 ML/MIN/1.73/M2
GLUCOSE SERPL-MCNC: 162 MG/DL (ref 70–110)
HCT VFR BLD AUTO: 61.6 % (ref 40–54)
HCV AB SERPL QL IA: NORMAL
HGB BLD-MCNC: 20.9 GM/DL (ref 14–18)
HIV 1+2 AB+HIV1 P24 AG SERPL QL IA: NORMAL
IMM GRANULOCYTES # BLD AUTO: 0.26 K/UL (ref 0–0.04)
IMM GRANULOCYTES NFR BLD AUTO: 1.1 % (ref 0–0.5)
INFLUENZA A MOLECULAR (OHS): NEGATIVE
INFLUENZA B MOLECULAR (OHS): NEGATIVE
INR PPP: 1.2 (ref 0.8–1.2)
IRON SATN MFR SERPL: 14 % (ref 20–50)
IRON SERPL-MCNC: 44 UG/DL (ref 45–160)
LACTATE SERPL-SCNC: 1.3 MMOL/L (ref 0.5–1.9)
LIPASE SERPL-CCNC: 25 U/L (ref 4–60)
LYMPHOCYTES # BLD AUTO: 2.26 K/UL (ref 1–4.8)
MAGNESIUM SERPL-MCNC: 1.7 MG/DL (ref 1.6–2.6)
MCH RBC QN AUTO: 30.2 PG (ref 27–31)
MCHC RBC AUTO-ENTMCNC: 33.9 G/DL (ref 32–36)
MCV RBC AUTO: 89 FL (ref 82–98)
NUCLEATED RBC (/100WBC) (SMH): 0 /100 WBC
PLATELET # BLD AUTO: 255 K/UL (ref 150–450)
PMV BLD AUTO: 10 FL (ref 9.2–12.9)
POCT GLUCOSE: 182 MG/DL (ref 70–110)
POTASSIUM SERPL-SCNC: 4.3 MMOL/L (ref 3.5–5.1)
PROCALCITONIN SERPL-MCNC: 0.1 NG/ML
PROT SERPL-MCNC: 8 GM/DL (ref 6–8.4)
PROTHROMBIN TIME: 12.8 SECONDS (ref 9–12.5)
RBC # BLD AUTO: 6.93 M/UL (ref 4.6–6.2)
RELATIVE EOSINOPHIL (SMH): 0.2 % (ref 0–8)
RELATIVE LYMPHOCYTE (SMH): 9.3 % (ref 18–48)
RELATIVE MONOCYTE (SMH): 5.7 % (ref 4–15)
RELATIVE NEUTROPHIL (SMH): 83.2 % (ref 38–73)
SARS-COV-2 RDRP RESP QL NAA+PROBE: NEGATIVE
SODIUM SERPL-SCNC: 140 MMOL/L (ref 136–145)
TIBC SERPL-MCNC: 322 UG/DL (ref 250–450)
TRANSFERRIN SERPL-MCNC: 230 MG/DL (ref 200–375)
WBC # BLD AUTO: 24.4 K/UL (ref 3.9–12.7)

## 2025-04-14 PROCEDURE — 87502 INFLUENZA DNA AMP PROBE: CPT

## 2025-04-14 PROCEDURE — 80053 COMPREHEN METABOLIC PANEL: CPT | Performed by: EMERGENCY MEDICINE

## 2025-04-14 PROCEDURE — 96361 HYDRATE IV INFUSION ADD-ON: CPT

## 2025-04-14 PROCEDURE — 86803 HEPATITIS C AB TEST: CPT | Performed by: EMERGENCY MEDICINE

## 2025-04-14 PROCEDURE — 25000003 PHARM REV CODE 250

## 2025-04-14 PROCEDURE — 99285 EMERGENCY DEPT VISIT HI MDM: CPT | Mod: 25

## 2025-04-14 PROCEDURE — 87389 HIV-1 AG W/HIV-1&-2 AB AG IA: CPT | Performed by: EMERGENCY MEDICINE

## 2025-04-14 PROCEDURE — 94799 UNLISTED PULMONARY SVC/PX: CPT | Mod: XB

## 2025-04-14 PROCEDURE — 94761 N-INVAS EAR/PLS OXIMETRY MLT: CPT

## 2025-04-14 PROCEDURE — 99900035 HC TECH TIME PER 15 MIN (STAT)

## 2025-04-14 PROCEDURE — 80074 ACUTE HEPATITIS PANEL: CPT

## 2025-04-14 PROCEDURE — 83735 ASSAY OF MAGNESIUM: CPT

## 2025-04-14 PROCEDURE — 85025 COMPLETE CBC W/AUTO DIFF WBC: CPT | Performed by: EMERGENCY MEDICINE

## 2025-04-14 PROCEDURE — 27000207 HC ISOLATION

## 2025-04-14 PROCEDURE — 96374 THER/PROPH/DIAG INJ IV PUSH: CPT

## 2025-04-14 PROCEDURE — 25500020 PHARM REV CODE 255: Performed by: EMERGENCY MEDICINE

## 2025-04-14 PROCEDURE — 63600175 PHARM REV CODE 636 W HCPCS: Performed by: EMERGENCY MEDICINE

## 2025-04-14 PROCEDURE — 36415 COLL VENOUS BLD VENIPUNCTURE: CPT

## 2025-04-14 PROCEDURE — 83690 ASSAY OF LIPASE: CPT | Performed by: EMERGENCY MEDICINE

## 2025-04-14 PROCEDURE — 85730 THROMBOPLASTIN TIME PARTIAL: CPT

## 2025-04-14 PROCEDURE — 36415 COLL VENOUS BLD VENIPUNCTURE: CPT | Performed by: INTERNAL MEDICINE

## 2025-04-14 PROCEDURE — 99900031 HC PATIENT EDUCATION (STAT)

## 2025-04-14 PROCEDURE — 83605 ASSAY OF LACTIC ACID: CPT

## 2025-04-14 PROCEDURE — 85610 PROTHROMBIN TIME: CPT

## 2025-04-14 PROCEDURE — 93005 ELECTROCARDIOGRAM TRACING: CPT | Performed by: GENERAL PRACTICE

## 2025-04-14 PROCEDURE — 96375 TX/PRO/DX INJ NEW DRUG ADDON: CPT

## 2025-04-14 PROCEDURE — 84466 ASSAY OF TRANSFERRIN: CPT

## 2025-04-14 PROCEDURE — 87040 BLOOD CULTURE FOR BACTERIA: CPT

## 2025-04-14 PROCEDURE — 94799 UNLISTED PULMONARY SVC/PX: CPT

## 2025-04-14 PROCEDURE — 85384 FIBRINOGEN ACTIVITY: CPT

## 2025-04-14 PROCEDURE — 12000002 HC ACUTE/MED SURGE SEMI-PRIVATE ROOM

## 2025-04-14 PROCEDURE — 93010 ELECTROCARDIOGRAM REPORT: CPT | Mod: ,,, | Performed by: GENERAL PRACTICE

## 2025-04-14 PROCEDURE — 25000003 PHARM REV CODE 250: Performed by: EMERGENCY MEDICINE

## 2025-04-14 PROCEDURE — 63600175 PHARM REV CODE 636 W HCPCS

## 2025-04-14 PROCEDURE — 82550 ASSAY OF CK (CPK): CPT

## 2025-04-14 PROCEDURE — 84145 PROCALCITONIN (PCT): CPT

## 2025-04-14 PROCEDURE — U0002 COVID-19 LAB TEST NON-CDC: HCPCS

## 2025-04-14 PROCEDURE — 25000003 PHARM REV CODE 250: Performed by: INTERNAL MEDICINE

## 2025-04-14 RX ORDER — IBUPROFEN 200 MG
24 TABLET ORAL
Status: DISCONTINUED | OUTPATIENT
Start: 2025-04-14 | End: 2025-04-15 | Stop reason: HOSPADM

## 2025-04-14 RX ORDER — LANOLIN ALCOHOL/MO/W.PET/CERES
800 CREAM (GRAM) TOPICAL
Status: DISCONTINUED | OUTPATIENT
Start: 2025-04-14 | End: 2025-04-15 | Stop reason: HOSPADM

## 2025-04-14 RX ORDER — NALOXONE HCL 0.4 MG/ML
0.02 VIAL (ML) INJECTION
Status: DISCONTINUED | OUTPATIENT
Start: 2025-04-14 | End: 2025-04-15 | Stop reason: HOSPADM

## 2025-04-14 RX ORDER — HYDROCODONE BITARTRATE AND ACETAMINOPHEN 5; 325 MG/1; MG/1
1 TABLET ORAL EVERY 6 HOURS PRN
Refills: 0 | Status: DISCONTINUED | OUTPATIENT
Start: 2025-04-14 | End: 2025-04-15 | Stop reason: HOSPADM

## 2025-04-14 RX ORDER — METFORMIN HYDROCHLORIDE 500 MG/1
500 TABLET, EXTENDED RELEASE ORAL 2 TIMES DAILY
COMMUNITY

## 2025-04-14 RX ORDER — INSULIN ASPART 100 [IU]/ML
0-5 INJECTION, SOLUTION INTRAVENOUS; SUBCUTANEOUS
Status: DISCONTINUED | OUTPATIENT
Start: 2025-04-14 | End: 2025-04-15 | Stop reason: HOSPADM

## 2025-04-14 RX ORDER — PANTOPRAZOLE SODIUM 40 MG/10ML
40 INJECTION, POWDER, LYOPHILIZED, FOR SOLUTION INTRAVENOUS DAILY
Status: DISCONTINUED | OUTPATIENT
Start: 2025-04-14 | End: 2025-04-15 | Stop reason: HOSPADM

## 2025-04-14 RX ORDER — SODIUM CHLORIDE 9 MG/ML
INJECTION, SOLUTION INTRAVENOUS CONTINUOUS
Status: DISCONTINUED | OUTPATIENT
Start: 2025-04-14 | End: 2025-04-15 | Stop reason: HOSPADM

## 2025-04-14 RX ORDER — GLUCAGON 1 MG
1 KIT INJECTION
Status: DISCONTINUED | OUTPATIENT
Start: 2025-04-14 | End: 2025-04-15 | Stop reason: HOSPADM

## 2025-04-14 RX ORDER — SODIUM,POTASSIUM PHOSPHATES 280-250MG
2 POWDER IN PACKET (EA) ORAL
Status: DISCONTINUED | OUTPATIENT
Start: 2025-04-14 | End: 2025-04-15 | Stop reason: HOSPADM

## 2025-04-14 RX ORDER — CITALOPRAM 40 MG/1
20 TABLET, FILM COATED ORAL DAILY
Status: ON HOLD | COMMUNITY
End: 2025-04-15 | Stop reason: HOSPADM

## 2025-04-14 RX ORDER — ALUMINUM HYDROXIDE, MAGNESIUM HYDROXIDE, AND SIMETHICONE 1200; 120; 1200 MG/30ML; MG/30ML; MG/30ML
30 SUSPENSION ORAL 4 TIMES DAILY PRN
Status: DISCONTINUED | OUTPATIENT
Start: 2025-04-14 | End: 2025-04-15 | Stop reason: HOSPADM

## 2025-04-14 RX ORDER — ACETAMINOPHEN 325 MG/1
650 TABLET ORAL EVERY 4 HOURS PRN
Status: DISCONTINUED | OUTPATIENT
Start: 2025-04-14 | End: 2025-04-15 | Stop reason: HOSPADM

## 2025-04-14 RX ORDER — IBUPROFEN 200 MG
16 TABLET ORAL
Status: DISCONTINUED | OUTPATIENT
Start: 2025-04-14 | End: 2025-04-15 | Stop reason: HOSPADM

## 2025-04-14 RX ORDER — DROPERIDOL 2.5 MG/ML
2.5 INJECTION, SOLUTION INTRAMUSCULAR; INTRAVENOUS
Status: COMPLETED | OUTPATIENT
Start: 2025-04-14 | End: 2025-04-14

## 2025-04-14 RX ORDER — SODIUM CHLORIDE 0.9 % (FLUSH) 0.9 %
10 SYRINGE (ML) INJECTION EVERY 12 HOURS PRN
Status: DISCONTINUED | OUTPATIENT
Start: 2025-04-14 | End: 2025-04-15 | Stop reason: HOSPADM

## 2025-04-14 RX ORDER — NAPROXEN SODIUM 220 MG/1
81 TABLET, FILM COATED ORAL DAILY
Status: DISCONTINUED | OUTPATIENT
Start: 2025-04-14 | End: 2025-04-15 | Stop reason: HOSPADM

## 2025-04-14 RX ORDER — TALC
6 POWDER (GRAM) TOPICAL NIGHTLY PRN
Status: DISCONTINUED | OUTPATIENT
Start: 2025-04-14 | End: 2025-04-15 | Stop reason: HOSPADM

## 2025-04-14 RX ADMIN — ASPIRIN 81 MG: 81 TABLET, CHEWABLE ORAL at 09:04

## 2025-04-14 RX ADMIN — PANTOPRAZOLE SODIUM 40 MG: 40 INJECTION, POWDER, FOR SOLUTION INTRAVENOUS at 05:04

## 2025-04-14 RX ADMIN — SODIUM CHLORIDE 1000 ML: 9 INJECTION, SOLUTION INTRAVENOUS at 10:04

## 2025-04-14 RX ADMIN — IOHEXOL 100 ML: 350 INJECTION, SOLUTION INTRAVENOUS at 11:04

## 2025-04-14 RX ADMIN — DROPERIDOL 2.5 MG: 2.5 INJECTION, SOLUTION INTRAMUSCULAR; INTRAVENOUS at 10:04

## 2025-04-14 RX ADMIN — PIPERACILLIN AND TAZOBACTAM 4.5 G: 4; .5 INJECTION, POWDER, LYOPHILIZED, FOR SOLUTION INTRAVENOUS; PARENTERAL at 05:04

## 2025-04-14 RX ADMIN — SODIUM CHLORIDE: 9 INJECTION, SOLUTION INTRAVENOUS at 05:04

## 2025-04-14 NOTE — H&P
[facility] - Emergency Dept  Hospital Medicine  History & Physical    Patient Name: Yogi Britt  MRN: [id number]  Patient Class: IP- Inpatient  Admission Date: 4/14/2025  Attending Physician: Juan Adorno MD   Primary Care Provider: Kristy Prieto DO         Patient information was obtained from patient, past medical records, and ER records.     Subjective:     Principal Problem:Abdominal pain, vomiting, and diarrhea    Chief Complaint:   Chief Complaint   Patient presents with    Abdominal Pain     Pt c/o generalized abdominal pain that comes in waves and fatigue. C/o nausea and diarrhea.         HPI: 29-year-old male presented to ED for eval of abdominal pain. pMHx DM, anxiety, seizure.  Patient reported he has been experiencing generalized abdominal pain, with associated fatigue, nausea, diarrhea, and vomiting.  Denies fever, chills.  Denies hematemesis, hematochezia, melena.  Denies chest pain, shortness of breath.  Of note, patient was admitted August 2024 or hypotension, leukocytosis, polycythemia, hyperglycemia.  In ED today, noted with WBCs 24.4, RBCs 6.9, hemoglobin/hematocrit 20.9/61.6, platelets 255.  Lipase and CMP unremarkable.  CT abdomen/pelvis impression with mild wall thickening of the small bowel with stranding in the adjacent fat suggestive of enteritis, no evidence of obstruction; minimal ascites; fatty infiltration of the liver.  ED discussed case with Hematology, IVF and Dr. Li will see.  Admit to hospital medicine for further eval.    Past Medical History:   Diagnosis Date    Anxiety     Seizures        History reviewed. No pertinent surgical history.    Review of patient's allergies indicates:  No Known Allergies    No current facility-administered medications on file prior to encounter.     Current Outpatient Medications on File Prior to Encounter   Medication Sig    Bifidobacterium infantis (ALIGN ORAL) Take 1 capsule by mouth Daily.    empagliflozin  (JARDIANCE) 25 mg tablet Take 1 tablet by mouth every morning.    metFORMIN (GLUCOPHAGE-XR) 500 MG ER 24hr tablet Take 500 mg by mouth 2 (two) times a day.    citalopram (CELEXA) 40 MG tablet Take 20 mg by mouth once daily. (Patient not taking: Reported on 4/14/2025)    [DISCONTINUED] pioglitazone (ACTOS) 15 MG tablet Take 15 mg by mouth once daily.    [DISCONTINUED] semaglutide (OZEMPIC) 0.25 mg or 0.5 mg (2 mg/3 mL) pen injector Inject 0.5 mg into the skin every 7 days. Saturdays     Family History    None       Tobacco Use    Smoking status: Never    Smokeless tobacco: Never   Substance and Sexual Activity    Alcohol use: Not on file    Drug use: Not on file    Sexual activity: Not on file     Review of Systems   Constitutional:  Positive for appetite change and fatigue. Negative for chills and fever.   HENT:  Negative for congestion and sore throat.    Eyes:  Negative for visual disturbance.   Respiratory:  Negative for shortness of breath.    Cardiovascular:  Negative for chest pain.   Gastrointestinal:  Positive for abdominal pain, diarrhea, nausea and vomiting. Negative for blood in stool.   Genitourinary:  Negative for hematuria.   Skin:  Positive for wound.   Neurological:  Negative for syncope.   Psychiatric/Behavioral:  Negative for confusion.      Objective:     Vital Signs (Most Recent):  Temp: 97.6 °F (36.4 °C) (04/14/25 0734)  Pulse: 92 (04/14/25 0734)  Resp: 17 (04/14/25 0734)  BP: 107/79 (04/14/25 0734)  SpO2: 100 % (04/14/25 0734) Vital Signs (24h Range):  Temp:  [97.6 °F (36.4 °C)] 97.6 °F (36.4 °C)  Pulse:  [92] 92  Resp:  [17] 17  SpO2:  [100 %] 100 %  BP: (107)/(79) 107/79     Weight: 98.4 kg (217 lb)  Body mass index is 29.43 kg/m².     Physical Exam  Vitals reviewed.   Constitutional:       General: He is not in acute distress.  HENT:      Head: Normocephalic and atraumatic.      Nose: Nose normal.      Mouth/Throat:      Mouth: Mucous membranes are moist.   Cardiovascular:      Rate and  "Rhythm: Normal rate and regular rhythm.   Pulmonary:      Effort: Pulmonary effort is normal. No respiratory distress.      Breath sounds: Normal breath sounds.   Abdominal:      General: Bowel sounds are normal. There is distension.      Palpations: Abdomen is soft.      Tenderness: There is abdominal tenderness. There is guarding.   Musculoskeletal:         General: Normal range of motion.      Cervical back: Normal range of motion.      Right lower leg: No edema.      Left lower leg: No edema.   Skin:     General: Skin is warm and dry.      Comments: C shaped lac to L cheek   Neurological:      Mental Status: He is alert and oriented to person, place, and time.   Psychiatric:         Mood and Affect: Mood normal.                Significant Labs: All pertinent labs within the past 24 hours have been reviewed.  Bilirubin:   Recent Labs   Lab 04/14/25  0956   BILITOT 0.5     CBC:   Recent Labs   Lab 04/14/25  0956   WBC 24.40*   HGB 20.9*   HCT 61.6*        CMP:   Recent Labs   Lab 04/14/25  0956      K 4.3   CO2 29   BUN 18   CREATININE 1.1   CALCIUM 10.5   ALBUMIN 5.2   BILITOT 0.5   ALKPHOS 69   AST 16   ALT 17     Cardiac Markers: No results for input(s): "CKMB", "MYOGLOBIN", "BNP", "TROPISTAT" in the last 48 hours.  Coagulation: No results for input(s): "PT", "INR", "APTT" in the last 48 hours.  Urine Studies: No results for input(s): "COLORU", "APPEARANCEUA", "PHUR", "SPECGRAV", "PROTEINUA", "GLUCUA", "KETONESU", "BILIRUBINUA", "OCCULTUA", "NITRITE", "UROBILINOGEN", "LEUKOCYTESUR", "RBCUA", "WBCUA", "BACTERIA", "SQUAMEPITHEL", "HYALINECASTS" in the last 48 hours.    Invalid input(s): "WRIGHTSUR"    Significant Imaging: I have reviewed all pertinent imaging results/findings within the past 24 hours.  Assessment/Plan:     Assessment & Plan  Abdominal pain, vomiting, and diarrhea  Procalcitonin, lactic acid, acute hepatitis panel, blood cx's  UA/UDS  Stool studies  Zosyn for " "now  Protonix  IVF  I&O  Leucocytosis  See above  COVID/flu swabs  CXR  Polycythemia  Iron studies, coags, daily CBC  IVF  Consult hem/onc    Diabetes mellitus  Patient's FSGs are controlled on current medication regimen.  Last A1c reviewed- No results found for: "LABA1C", "HGBA1C"  Most recent fingerstick glucose reviewed- No results for input(s): "POCTGLUCOSE" in the last 24 hours.  Current correctional scale  Low  Maintain anti-hyperglycemic dose as follows-   Antihyperglycemics (From admission, onward)      Start     Stop Route Frequency Ordered    04/14/25 1708  insulin aspart U-100 pen 0-5 Units         -- SubQ Before meals & nightly PRN 04/14/25 1612          Hold Oral hypoglycemics while patient is in the hospital.  VTE Risk Mitigation (From admission, onward)           Ordered     IP VTE LOW RISK PATIENT  Once         04/14/25 1612     Place sequential compression device  Until discontinued         04/14/25 1612                                    Chyna Velasco NP  Department of Hospital Medicine  Former[facility] - Emergency Dept          "

## 2025-04-14 NOTE — SUBJECTIVE & OBJECTIVE
Past Medical History:   Diagnosis Date    Anxiety     Seizures        History reviewed. No pertinent surgical history.    Review of patient's allergies indicates:  No Known Allergies    No current facility-administered medications on file prior to encounter.     Current Outpatient Medications on File Prior to Encounter   Medication Sig    Bifidobacterium infantis (ALIGN ORAL) Take 1 capsule by mouth Daily.    empagliflozin (JARDIANCE) 25 mg tablet Take 1 tablet by mouth every morning.    metFORMIN (GLUCOPHAGE-XR) 500 MG ER 24hr tablet Take 500 mg by mouth 2 (two) times a day.    citalopram (CELEXA) 40 MG tablet Take 20 mg by mouth once daily. (Patient not taking: Reported on 4/14/2025)    [DISCONTINUED] pioglitazone (ACTOS) 15 MG tablet Take 15 mg by mouth once daily.    [DISCONTINUED] semaglutide (OZEMPIC) 0.25 mg or 0.5 mg (2 mg/3 mL) pen injector Inject 0.5 mg into the skin every 7 days. Saturdays     Family History    None       Tobacco Use    Smoking status: Never    Smokeless tobacco: Never   Substance and Sexual Activity    Alcohol use: Not on file    Drug use: Not on file    Sexual activity: Not on file     Review of Systems   Constitutional:  Positive for appetite change and fatigue. Negative for chills and fever.   HENT:  Negative for congestion and sore throat.    Eyes:  Negative for visual disturbance.   Respiratory:  Negative for shortness of breath.    Cardiovascular:  Negative for chest pain.   Gastrointestinal:  Positive for abdominal pain, diarrhea, nausea and vomiting. Negative for blood in stool.   Genitourinary:  Negative for hematuria.   Skin:  Positive for wound.   Neurological:  Negative for syncope.   Psychiatric/Behavioral:  Negative for confusion.      Objective:     Vital Signs (Most Recent):  Temp: 97.6 °F (36.4 °C) (04/14/25 0734)  Pulse: 92 (04/14/25 0734)  Resp: 17 (04/14/25 0734)  BP: 107/79 (04/14/25 0734)  SpO2: 100 % (04/14/25 0734) Vital Signs (24h Range):  Temp:  [97.6 °F (36.4  "°C)] 97.6 °F (36.4 °C)  Pulse:  [92] 92  Resp:  [17] 17  SpO2:  [100 %] 100 %  BP: (107)/(79) 107/79     Weight: 98.4 kg (217 lb)  Body mass index is 29.43 kg/m².     Physical Exam  Vitals reviewed.   Constitutional:       General: He is not in acute distress.  HENT:      Head: Normocephalic and atraumatic.      Nose: Nose normal.      Mouth/Throat:      Mouth: Mucous membranes are moist.   Cardiovascular:      Rate and Rhythm: Normal rate and regular rhythm.   Pulmonary:      Effort: Pulmonary effort is normal. No respiratory distress.      Breath sounds: Normal breath sounds.   Abdominal:      General: Bowel sounds are normal. There is distension.      Palpations: Abdomen is soft.      Tenderness: There is abdominal tenderness. There is guarding.   Musculoskeletal:         General: Normal range of motion.      Cervical back: Normal range of motion.      Right lower leg: No edema.      Left lower leg: No edema.   Skin:     General: Skin is warm and dry.      Comments: C shaped lac to L cheek   Neurological:      Mental Status: He is alert and oriented to person, place, and time.   Psychiatric:         Mood and Affect: Mood normal.                Significant Labs: All pertinent labs within the past 24 hours have been reviewed.  Bilirubin:   Recent Labs   Lab 04/14/25  0956   BILITOT 0.5     CBC:   Recent Labs   Lab 04/14/25  0956   WBC 24.40*   HGB 20.9*   HCT 61.6*        CMP:   Recent Labs   Lab 04/14/25  0956      K 4.3   CO2 29   BUN 18   CREATININE 1.1   CALCIUM 10.5   ALBUMIN 5.2   BILITOT 0.5   ALKPHOS 69   AST 16   ALT 17     Cardiac Markers: No results for input(s): "CKMB", "MYOGLOBIN", "BNP", "TROPISTAT" in the last 48 hours.  Coagulation: No results for input(s): "PT", "INR", "APTT" in the last 48 hours.  Urine Studies: No results for input(s): "COLORU", "APPEARANCEUA", "PHUR", "SPECGRAV", "PROTEINUA", "GLUCUA", "KETONESU", "BILIRUBINUA", "OCCULTUA", "NITRITE", "UROBILINOGEN", "LEUKOCYTESUR", " ""RBCUA", "WBCUA", "BACTERIA", "SQUAMEPITHEL", "HYALINECASTS" in the last 48 hours.    Invalid input(s): "WRIGHTSUR"    Significant Imaging: I have reviewed all pertinent imaging results/findings within the past 24 hours.  "

## 2025-04-14 NOTE — PHARMACY MED REC
"Admission Medication History     The home medication history was taken by Nargis Jeronimo.    You may go to "Admission" then "Reconcile Home Medications" tabs to review and/or act upon these items.     The home medication list has been updated by the Pharmacy department.   Please read ALL comments highlighted in yellow.   Please address this information as you see fit.    Feel free to contact us if you have any questions or require assistance.      The medications listed below were removed from the home medication list. Please reorder if appropriate:  Patient reports no longer taking the following medication(s):  Actos  Ozempic      Medications listed below were obtained from: Patient/family and Analytic software- [employer]  Medications Ordered Prior to Encounter[1]    Potential issues to be addressed PRIOR TO DISCHARGE  Patient reported not taking the following medications: (Celexa). These medications remain on the home medication list. Please address accordingly.     Nargis Jeronimo  EXT 1923                  .               [1]   No current facility-administered medications on file prior to encounter.     Current Outpatient Medications on File Prior to Encounter   Medication Sig Dispense Refill    citalopram (CELEXA) 40 MG tablet Take 20 mg by mouth once daily. (Patient not taking: Reported on 4/14/2025)      empagliflozin (JARDIANCE) 25 mg tablet Take 1 tablet by mouth every morning.      metFORMIN (GLUCOPHAGE-XR) 500 MG ER 24hr tablet Take 500 mg by mouth 2 (two) times a day.      Bifidobacterium infantis (ALIGN ORAL) Take 1 capsule by mouth Daily.      [DISCONTINUED] pioglitazone (ACTOS) 15 MG tablet Take 15 mg by mouth once daily.      [DISCONTINUED] semaglutide (OZEMPIC) 0.25 mg or 0.5 mg (2 mg/3 mL) pen injector Inject 0.5 mg into the skin every 7 days. Saturdays       "

## 2025-04-14 NOTE — ASSESSMENT & PLAN NOTE
Procalcitonin, lactic acid, acute hepatitis panel, blood cx's  UA/UDS  Stool studies  Zosyn for now  Protonix  IVF  I&O

## 2025-04-14 NOTE — ASSESSMENT & PLAN NOTE
"Patient's FSGs are controlled on current medication regimen.  Last A1c reviewed- No results found for: "LABA1C", "HGBA1C"  Most recent fingerstick glucose reviewed- No results for input(s): "POCTGLUCOSE" in the last 24 hours.  Current correctional scale  Low  Maintain anti-hyperglycemic dose as follows-   Antihyperglycemics (From admission, onward)      Start     Stop Route Frequency Ordered    04/14/25 1708  insulin aspart U-100 pen 0-5 Units         -- SubQ Before meals & nightly PRN 04/14/25 1612          Hold Oral hypoglycemics while patient is in the hospital.  "

## 2025-04-14 NOTE — HPI
29-year-old male presented to ED for eval of abdominal pain. pMHx DM, anxiety, seizure.  Patient reported he has been experiencing generalized abdominal pain, with associated fatigue, nausea, diarrhea, and vomiting.  Denies fever, chills.  Denies hematemesis, hematochezia, melena.  Denies chest pain, shortness of breath.  Of note, patient was admitted August 2024 or hypotension, leukocytosis, polycythemia, hyperglycemia.  In ED today, noted with WBCs 24.4, RBCs 6.9, hemoglobin/hematocrit 20.9/61.6, platelets 255.  Lipase and CMP unremarkable.  CT abdomen/pelvis impression with mild wall thickening of the small bowel with stranding in the adjacent fat suggestive of enteritis, no evidence of obstruction; minimal ascites; fatty infiltration of the liver.  ED discussed case with Hematology, IVF and Dr. Li will see.  Admit to hospital medicine for further eval.

## 2025-04-14 NOTE — ED PROVIDER NOTES
Encounter Date: 4/14/2025       History     Chief Complaint   Patient presents with    Abdominal Pain     Pt c/o generalized abdominal pain that comes in waves and fatigue. C/o nausea and diarrhea.      29-year-old male past medical history of diabetes, anxiety, obesity presents today with abdominal pain nausea and vomiting.  Patient was previously on Ozempic but discontinued 6 months ago due to similar episodes.  He says his 3rd episode of this abdominal pain nausea or vomiting.  Patient states it is worse episode he had GI bleed in ulcers but denies any blood in his stools flags in his or hematemesis.  Patient has smoked some recent admission about 6 months ago in August 2024 when he was admitted for hypotension, leukocytosis, polycythemia and hyperglycemia.    Patient reports that has had negative endoscopy and colonoscopy in the past underlying unable to see them in the records.    The history is provided by the patient. No  was used.     Review of patient's allergies indicates:  No Known Allergies  Past Medical History:   Diagnosis Date    Anxiety     Seizures      History reviewed. No pertinent surgical history.  No family history on file.  Social History[1]  Review of Systems    Physical Exam     Initial Vitals [04/14/25 0734]   BP Pulse Resp Temp SpO2   107/79 92 17 97.6 °F (36.4 °C) 100 %      MAP       --         Physical Exam    Nursing note and vitals reviewed.  Constitutional: He appears well-developed. No distress.   HENT:   Head: Normocephalic and atraumatic.   Nose: Nose normal.   Eyes: EOM are normal.   Neck: Neck supple. No tracheal deviation present. No JVD present.   Cardiovascular:  Normal rate, regular rhythm, normal heart sounds and intact distal pulses.     Exam reveals no gallop and no friction rub.       No murmur heard.  Pulmonary/Chest: Breath sounds normal. No respiratory distress. He has no wheezes. He has no rhonchi. He has no rales.   Abdominal: Abdomen is soft.  Bowel sounds are normal. He exhibits no distension. There is abdominal tenderness. There is no rebound (Mild diffuse ttp) and no guarding.   Musculoskeletal:         General: Normal range of motion.      Cervical back: Neck supple.     Neurological: He is alert and oriented to person, place, and time. He has normal strength. No cranial nerve deficit or sensory deficit.   Skin: Skin is warm and dry. Capillary refill takes less than 2 seconds. No rash noted.   Psychiatric: He has a normal mood and affect.         ED Course   Procedures  Labs Reviewed   COMPREHENSIVE METABOLIC PANEL - Abnormal       Result Value    Sodium 140      Potassium 4.3      Chloride 101      CO2 29      Glucose 162 (*)     BUN 18      Creatinine 1.1      Calcium 10.5      Protein Total 8.0      Albumin 5.2      Bilirubin Total 0.5      ALP 69      AST 16      ALT 17      Anion Gap 10      eGFR >60     CBC WITH DIFFERENTIAL - Abnormal    WBC 24.40 (*)     RBC 6.93 (*)     Hgb 20.9 (*)     Hct 61.6 (*)     MCV 89      MCH 30.2      MCHC 33.9      RDW 13.0      Platelet Count 255      MPV 10.0      Nucleated RBC 0      Neut % 83.2 (*)     Lymph % 9.3 (*)     Mono % 5.7      Eos % 0.2      Basophil % 0.5      Imm Grans % 1.1 (*)     Neut # 20.3 (*)     Lymph # 2.26      Mono # 1.39 (*)     Eos # 0.06      Baso # 0.12      Imm Grans # 0.26 (*)    LIPASE - Normal    Lipase Level 25     LACTIC ACID, PLASMA - Normal    Lactic Acid Level 1.3      Narrative:     Falsely low lactic acid results can be found in samples containing >=13.0 mg/dL total bilirubin and/or >=3.5 mg/dL direct bilirubin.    CLOSTRIDIUM DIFFICILE   CULTURE, STOOL   CULTURE, BLOOD   CULTURE, BLOOD   INFLUENZA A & B BY MOLECULAR   CBC W/ AUTO DIFFERENTIAL    Narrative:     The following orders were created for panel order CBC W/ AUTO DIFFERENTIAL.  Procedure                               Abnormality         Status                     ---------                               -----------          ------                     CBC with Differential[[id number]]       Abnormal            Final result                 Please view results for these tests on the individual orders.   HEPATITIS C ANTIBODY   HIV 1 / 2 ANTIBODY   URINALYSIS, REFLEX TO URINE CULTURE   PROCALCITONIN   MAGNESIUM   PROTIME-INR   APTT   FIBRINOGEN   IRON AND TIBC   URINALYSIS, REFLEX TO URINE CULTURE   TOXICOLOGY SCREEN, URINE, RANDOM (COMPLIANCE)   OCCULT BLOOD X 1, STOOL   WBC, STOOL   HEPATITIS PANEL, ACUTE   CK   SARS-COV-2 RNA AMPLIFICATION, QUAL   EXTRA TUBES    Narrative:     The following orders were created for panel order EXTRA TUBES.  Procedure                               Abnormality         Status                     ---------                               -----------         ------                     Light Blue Top Hold[[id number]]                             In process                 Lavender Top Hold[[id number]]                               In process                   Please view results for these tests on the individual orders.   LIGHT BLUE TOP HOLD   LAVENDER TOP HOLD   POCT GLUCOSE, HAND-HELD DEVICE        ECG Results              EKG 12-lead (In process)        Collection Time Result Time QRS Duration OHS QTC Calculation    04/14/25 16:38:43 04/14/25 16:41:28 82 433                     In process by Interface, Lab In [facility] (04/14/25 16:41:36)                   Narrative:    Test Reason : Z91.89,    Vent. Rate :  79 BPM     Atrial Rate :  79 BPM     P-R Int : 138 ms          QRS Dur :  82 ms      QT Int : 378 ms       P-R-T Axes :  28  59  33 degrees    QTcB Int : 433 ms    Normal sinus rhythm  Low voltage QRS  Borderline Abnormal ECG  When compared with ECG of 13-Aug-2024 13:20,  No significant change was found    Referred By: AAAREFERRAL SELF           Confirmed By:                                   Imaging Results              X-Ray Chest 1 View (Final result)  Result time 04/14/25 16:28:17      Final  result by Newton Santa MD (04/14/25 16:28:17)                   Impression:      No evidence of active cardiopulmonary disease.      Electronically signed by: Newton Santa  Date:    04/14/2025  Time:    16:28               Narrative:    EXAMINATION:  XR CHEST 1 VIEW    CLINICAL HISTORY:  leukocytosis;    FINDINGS:  Portable chest radiograph at 16:24 hours compared to prior exams shows the cardiomediastinal silhouette and pulmonary vasculature are within normal limits.    The lungs are normally expanded, with no consolidation, large pleural effusion, or evidence of pulmonary edema. No pneumothorax. There are no significant osseous abnormalities.                                       CT Abdomen Pelvis With IV Contrast NO Oral Contrast (Final result)  Result time 04/14/25 11:20:29      Final result by Allegra Brown MD (04/14/25 11:20:29)                   Impression:      Mild wall thickening of the small bowel with stranding in the adjacent fat suggestive of enteritis.  There is no evidence of obstruction    Minimal ascites    Fatty infiltration of the liver      Electronically signed by: Allegra Brown  Date:    04/14/2025  Time:    11:20               Narrative:      CMS MANDATED QUALITY DATA - CT RADIATION - 436    All CT scans at this facility utilize dose modulation, iterative reconstruction, and/or weight based dosing when appropriate to reduce radiation dose to as low as reasonably achievable.    CLINICAL HISTORY:  ([id number])28 y/o  (1995) M    Abdominal pain, acute, nonlocalized;    TECHNIQUE:  (A#[id number], exam time 4/14/2025 11:16)    CT ABDOMEN PELVIS WITH IV CONTRAST [id number]    Axial CT images of the abdomen and pelvis were obtained from the dome of the diaphragm to the proximal thighs.    100cc omnipague 350 IV contrast was given    COMPARISON:  08/11/2024    FINDINGS:  Lower Thorax:    The lung bases are clear. The heart is normal in size.    CT Abdomen:    Liver: Relative diffuse  low-attenuation liver consistent with hepatic steatosis.  There is no focal mass.    Gallbladder: The gallbladder is within normal limits.    Biliary Tree: No intra or extrahepatic ductal dilation.    Spleen: Normal.    Pancreas: The pancreas is normal.    Adrenal Glands: Normal    Kidneys: The kidneys are normal in imaging appearance without hydronephrosis or hydroureter.    Vasculature: Normal.    Lymph nodes: No abdominal lymphadenopathy is seen.    Intraperitoneal structures: Minimal ascites around the liver and within the pelvis.    Bowel: Stomach is normal.    There is mild wall thickening of the small bowel with stranding in the fat suggestive of enteritis.  The colon is unremarkable.  The appendix is normal.    Abdominal wall: 20 the state    Musculoskeletal: Normal.    CT Pelvis:    Bladder: Normal.    Reproductive Organs: The prostate and seminal vesicles are within normal limits.    Pelvic Lymph nodes: No pelvic lymphadenopathy or pelvic mass is identified.                                       Medications   sodium chloride 0.9% flush 10 mL (has no administration in time range)   melatonin tablet 6 mg (has no administration in time range)   aluminum-magnesium hydroxide-simethicone 200-200-20 mg/5 mL suspension 30 mL (has no administration in time range)   acetaminophen tablet 650 mg (has no administration in time range)   HYDROcodone-acetaminophen 5-325 mg per tablet 1 tablet (has no administration in time range)   naloxone 0.4 mg/mL injection 0.02 mg (has no administration in time range)   potassium bicarbonate disintegrating tablet 50 mEq (has no administration in time range)   potassium bicarbonate disintegrating tablet 35 mEq (has no administration in time range)   potassium bicarbonate disintegrating tablet 60 mEq (has no administration in time range)   magnesium oxide tablet 800 mg (has no administration in time range)   magnesium oxide tablet 800 mg (has no administration in time range)    potassium, sodium phosphates 280-160-250 mg packet 2 packet (has no administration in time range)   potassium, sodium phosphates 280-160-250 mg packet 2 packet (has no administration in time range)   potassium, sodium phosphates 280-160-250 mg packet 2 packet (has no administration in time range)   glucose chewable tablet 16 g (has no administration in time range)   glucose chewable tablet 24 g (has no administration in time range)   dextrose 50% injection 12.5 g (has no administration in time range)   dextrose 50% injection 25 g (has no administration in time range)   glucagon (human recombinant) injection 1 mg (has no administration in time range)   0.9% NaCl infusion ( Intravenous New Bag 4/14/25 1716)   promethazine (PHENERGAN) 12.5 mg in 0.9% NaCl 50 mL IVPB (has no administration in time range)   insulin aspart U-100 pen 0-5 Units (has no administration in time range)   piperacillin-tazobactam (ZOSYN) 4.5 g in D5W 100 mL IVPB (MB+) (4.5 g Intravenous New Bag 4/14/25 1722)   pantoprazole injection 40 mg (40 mg Intravenous Given 4/14/25 1718)   droPERidol injection 2.5 mg (2.5 mg Intravenous Given 4/14/25 1054)   sodium chloride 0.9% bolus 1,000 mL 1,000 mL (0 mLs Intravenous Stopped 4/14/25 1633)   iohexoL (OMNIPAQUE 350) injection 100 mL (100 mLs Intravenous Given 4/14/25 1113)     Medical Decision Making  Amount and/or Complexity of Data Reviewed  Labs: ordered.  Radiology: ordered.    Risk  Prescription drug management.  Decision regarding hospitalization.               ED Course as of 04/14/25 1726   Mon Apr 14, 2025   1135 Impression:     Mild wall thickening of the small bowel with stranding in the adjacent fat suggestive of enteritis.  There is no evidence of obstruction     Minimal ascites     Fatty infiltration of the liver        Electronically signed by:Allegra Brown  Date:                                            04/14/2025  Time:                                           11:20   [BD]   0833  Discussed with jong Epstein, who recommended admission for hyperviscosity syndrome and he will initiate phlebotomy, aspirin, flow cytometry.  [BD]   0562 29-year-old male past medical history of diabetes, anxiety, obesity presents today with abdominal pain nausea and vomiting.  [BD]   1521 Leukocytosis WBC 24  Polycythemia Hb 20.9   Both increased from previous high.    [BD]      ED Course User Index  [BD] Levi Ackerman MD                           Clinical Impression:  Final diagnoses:  [D75.1] Polycythemia (Primary)  [R10.9, R11.10, R19.7] Abdominal pain, vomiting, and diarrhea          ED Disposition Condition    Admit                   [1]   Social History  Tobacco Use    Smoking status: Never    Smokeless tobacco: Never        Levi Ackerman MD  04/14/25 2939

## 2025-04-14 NOTE — Clinical Note
Diagnosis: Abdominal pain, vomiting, and diarrhea [[id number]]   Future Attending Provider: LEILANI COLORADO [[id number]]   Place in Observation: [facility] [[id number]]

## 2025-04-15 VITALS
HEART RATE: 79 BPM | WEIGHT: 217 LBS | SYSTOLIC BLOOD PRESSURE: 141 MMHG | TEMPERATURE: 98 F | HEIGHT: 72 IN | RESPIRATION RATE: 16 BRPM | DIASTOLIC BLOOD PRESSURE: 99 MMHG | OXYGEN SATURATION: 98 % | BODY MASS INDEX: 29.39 KG/M2

## 2025-04-15 LAB
ABSOLUTE EOSINOPHIL (SMH): 0.15 K/UL
ABSOLUTE MONOCYTE (SMH): 0.6 K/UL (ref 0.3–1)
ABSOLUTE NEUTROPHIL COUNT (SMH): 6.9 K/UL (ref 1.8–7.7)
ALBUMIN SERPL-MCNC: 3.9 G/DL (ref 3.5–5.2)
ALP SERPL-CCNC: 47 UNIT/L (ref 55–135)
ALT SERPL-CCNC: 12 UNIT/L (ref 10–44)
AMPHET UR QL SCN: NEGATIVE
ANION GAP (SMH): 4 MMOL/L (ref 8–16)
AST SERPL-CCNC: 9 UNIT/L (ref 10–40)
BARBITURATE SCN PRESENT UR: NEGATIVE
BASOPHILS # BLD AUTO: 0.02 K/UL
BASOPHILS NFR BLD AUTO: 0.2 %
BENZODIAZ UR QL SCN: NEGATIVE
BILIRUB SERPL-MCNC: 0.8 MG/DL (ref 0.1–1)
BILIRUB UR QL STRIP.AUTO: NEGATIVE
BUN SERPL-MCNC: 14 MG/DL (ref 6–20)
CALCIUM SERPL-MCNC: 9.1 MG/DL (ref 8.7–10.5)
CANNABINOIDS UR QL SCN: NEGATIVE
CHLORIDE SERPL-SCNC: 108 MMOL/L (ref 95–110)
CLARITY UR: CLEAR
CO2 SERPL-SCNC: 27 MMOL/L (ref 23–29)
COCAINE UR QL SCN: NEGATIVE
COLOR UR AUTO: YELLOW
CREAT SERPL-MCNC: 1 MG/DL (ref 0.5–1.4)
CREAT UR-MCNC: 182.4 MG/DL (ref 23–375)
ERYTHROCYTE [DISTWIDTH] IN BLOOD BY AUTOMATED COUNT: 12.8 % (ref 11.5–14.5)
GFR SERPLBLD CREATININE-BSD FMLA CKD-EPI: >60 ML/MIN/1.73/M2
GLUCOSE SERPL-MCNC: 148 MG/DL (ref 70–110)
GLUCOSE UR QL STRIP: ABNORMAL
HCT VFR BLD AUTO: 45.5 % (ref 40–54)
HGB BLD-MCNC: 15.1 GM/DL (ref 14–18)
HGB UR QL STRIP: NEGATIVE
IMM GRANULOCYTES # BLD AUTO: 0.04 K/UL (ref 0–0.04)
IMM GRANULOCYTES NFR BLD AUTO: 0.4 % (ref 0–0.5)
KETONES UR QL STRIP: ABNORMAL
LEUKOCYTE ESTERASE UR QL STRIP: NEGATIVE
LYMPHOCYTES # BLD AUTO: 2.2 K/UL (ref 1–4.8)
MAGNESIUM SERPL-MCNC: 1.8 MG/DL (ref 1.6–2.6)
MCH RBC QN AUTO: 29.4 PG (ref 27–31)
MCHC RBC AUTO-ENTMCNC: 33.2 G/DL (ref 32–36)
MCV RBC AUTO: 89 FL (ref 82–98)
MICROSCOPIC COMMENT: NORMAL
NITRITE UR QL STRIP: NEGATIVE
NUCLEATED RBC (/100WBC) (SMH): 0 /100 WBC
OHS QRS DURATION: 82 MS
OHS QTC CALCULATION: 433 MS
OPIATES UR QL SCN: NEGATIVE
PCP UR QL: NEGATIVE
PH UR STRIP: 6 [PH]
PLATELET # BLD AUTO: 188 K/UL (ref 150–450)
PMV BLD AUTO: 10 FL (ref 9.2–12.9)
POCT GLUCOSE: 122 MG/DL (ref 70–110)
POCT GLUCOSE: 131 MG/DL (ref 70–110)
POTASSIUM SERPL-SCNC: 4.4 MMOL/L (ref 3.5–5.1)
PROT SERPL-MCNC: 5.9 GM/DL (ref 6–8.4)
PROT UR QL STRIP: NEGATIVE
RBC # BLD AUTO: 5.14 M/UL (ref 4.6–6.2)
RBC #/AREA URNS AUTO: 1 /HPF
RELATIVE EOSINOPHIL (SMH): 1.5 % (ref 0–8)
RELATIVE LYMPHOCYTE (SMH): 22.2 % (ref 18–48)
RELATIVE MONOCYTE (SMH): 6.1 % (ref 4–15)
RELATIVE NEUTROPHIL (SMH): 69.6 % (ref 38–73)
SODIUM SERPL-SCNC: 139 MMOL/L (ref 136–145)
SP GR UR STRIP: >=1.03
UROBILINOGEN UR STRIP-ACNC: NEGATIVE EU/DL
WBC # BLD AUTO: 9.89 K/UL (ref 3.9–12.7)
WBC #/AREA URNS AUTO: 1 /HPF

## 2025-04-15 PROCEDURE — 80307 DRUG TEST PRSMV CHEM ANLYZR: CPT | Performed by: INTERNAL MEDICINE

## 2025-04-15 PROCEDURE — 80307 DRUG TEST PRSMV CHEM ANLYZR: CPT

## 2025-04-15 PROCEDURE — 94761 N-INVAS EAR/PLS OXIMETRY MLT: CPT

## 2025-04-15 PROCEDURE — 80053 COMPREHEN METABOLIC PANEL: CPT

## 2025-04-15 PROCEDURE — 63600175 PHARM REV CODE 636 W HCPCS

## 2025-04-15 PROCEDURE — 81003 URINALYSIS AUTO W/O SCOPE: CPT | Performed by: EMERGENCY MEDICINE

## 2025-04-15 PROCEDURE — 25000003 PHARM REV CODE 250: Performed by: INTERNAL MEDICINE

## 2025-04-15 PROCEDURE — 36415 COLL VENOUS BLD VENIPUNCTURE: CPT

## 2025-04-15 PROCEDURE — 99900035 HC TECH TIME PER 15 MIN (STAT)

## 2025-04-15 PROCEDURE — 25000003 PHARM REV CODE 250

## 2025-04-15 PROCEDURE — 83735 ASSAY OF MAGNESIUM: CPT

## 2025-04-15 PROCEDURE — 85025 COMPLETE CBC W/AUTO DIFF WBC: CPT

## 2025-04-15 PROCEDURE — 99222 1ST HOSP IP/OBS MODERATE 55: CPT | Mod: ,,, | Performed by: INTERNAL MEDICINE

## 2025-04-15 RX ORDER — METRONIDAZOLE 500 MG/1
500 TABLET ORAL EVERY 8 HOURS
Qty: 21 TABLET | Refills: 0 | Status: SHIPPED | OUTPATIENT
Start: 2025-04-15 | End: 2025-04-22

## 2025-04-15 RX ADMIN — LACTOBACILLUS TAB 1 TABLET: TAB at 08:04

## 2025-04-15 RX ADMIN — PIPERACILLIN AND TAZOBACTAM 4.5 G: 4; .5 INJECTION, POWDER, LYOPHILIZED, FOR SOLUTION INTRAVENOUS; PARENTERAL at 08:04

## 2025-04-15 RX ADMIN — PANTOPRAZOLE SODIUM 40 MG: 40 INJECTION, POWDER, FOR SOLUTION INTRAVENOUS at 08:04

## 2025-04-15 RX ADMIN — ASPIRIN 81 MG: 81 TABLET, CHEWABLE ORAL at 08:04

## 2025-04-15 RX ADMIN — SODIUM CHLORIDE: 9 INJECTION, SOLUTION INTRAVENOUS at 12:04

## 2025-04-15 RX ADMIN — PIPERACILLIN AND TAZOBACTAM 4.5 G: 4; .5 INJECTION, POWDER, LYOPHILIZED, FOR SOLUTION INTRAVENOUS; PARENTERAL at 12:04

## 2025-04-15 NOTE — PLAN OF CARE
[facility]  Initial Discharge Assessment       Primary Care Provider: Kristy Prieto DO    Admission Diagnosis: Abdominal pain, vomiting, and diarrhea [R10.9, R11.10, R19.7]    Admission Date: 4/14/2025  Expected Discharge Date: 4/15/2025    Transition of Care Barriers: None    Assessment completed at bedside with patient.  Patient lives with s/o, independent at baseline, drives to all appointments.  Denies HH/HD/DME/Coumadin.  Girlfriend to provide transportation on discharge.      Payor: [employer] / Plan: [facility] [employer] / Product Type: Government /     Extended Emergency Contact Information  Primary Emergency Contact: Kristy Dorman  Mobile Phone: [phone]  Relation: Significant other  Preferred language: English   needed? No  Secondary Emergency Contact: Kevin Britt  Address: [address] [place] [address] [facility]  Home Phone: [phone]  Mobile Phone: [phone]  Relation: Father    Discharge Plan A: Home with family  Discharge Plan B: Home with family      [id number] - [place] - [address] AT [facility] [address]  [address]  [place] [address]  Phone: [phone] Fax: [phone]    [facility] - [place], [place] - [address]  [address] [facility] [address]  Phone: [phone] Fax: [phone]    [facility]  [address]  [facility] [address]  Phone: [phone] Fax: [phone]    [id number] - [place], [place] - [address] AT [address] & [facility]  [address] [facility] [address]  Phone: [phone] Fax: [phone]      Initial Assessment (most recent)       Adult Discharge Assessment - 04/15/25 1040          Discharge Assessment    Assessment Type Discharge Planning Assessment     Confirmed/corrected address, phone number and insurance Yes     Confirmed Demographics Correct on Facesheet      Source of Information patient     Communicated CHEY with patient/caregiver Yes     People in Home significant other     Do you expect to return to your current living situation? Yes     Do you have help at home or someone to help you manage your care at home? Yes     Who are your caregiver(s) and their phone number(s)? Kristy Dandy (s/o) [phone]     Prior to hospitilization cognitive status: Alert/Oriented     Current cognitive status: Alert/Oriented     Walking or Climbing Stairs Difficulty no     Dressing/Bathing Difficulty no     Equipment Currently Used at Home none     Readmission within 30 days? No     Patient currently being followed by outpatient case management? No     Do you currently have service(s) that help you manage your care at home? No     Do you take prescription medications? Yes     Do you have prescription coverage? Yes     Coverage [employer] - [facility] [employer] [employer] -     Do you have any problems affording any of your prescribed medications? No     Is the patient taking medications as prescribed? yes     Who is going to help you get home at discharge? girlfriend     How do you get to doctors appointments? car, drives self     Are you on dialysis? No     Do you take coumadin? No     Discharge Plan A Home with family     Discharge Plan B Home with family     DME Needed Upon Discharge  none     Discharge Plan discussed with: Patient     Transition of Care Barriers None

## 2025-04-15 NOTE — DISCHARGE SUMMARY
[facility] Medicine  Discharge Summary      Patient Name: Yogi Britt  MRN: [id number]  TEMO: [id number]  Patient Class: IP- Inpatient  Admission Date: 4/14/2025  Hospital Length of Stay: 1 days  Discharge Date and Time: 04/15/2025 3:16 PM  Attending Physician: No att. providers found   Discharging Provider: Héctor Clement MD  Primary Care Provider: Kristy Prieto DO    Primary Care Team: Networked reference to record PCT     HPI:   29-year-old male presented to ED for eval of abdominal pain. pMHx DM, anxiety, seizure.  Patient reported he has been experiencing generalized abdominal pain, with associated fatigue, nausea, diarrhea, and vomiting.  Denies fever, chills.  Denies hematemesis, hematochezia, melena.  Denies chest pain, shortness of breath.  Of note, patient was admitted August 2024 or hypotension, leukocytosis, polycythemia, hyperglycemia.  In ED today, noted with WBCs 24.4, RBCs 6.9, hemoglobin/hematocrit 20.9/61.6, platelets 255.  Lipase and CMP unremarkable.  CT abdomen/pelvis impression with mild wall thickening of the small bowel with stranding in the adjacent fat suggestive of enteritis, no evidence of obstruction; minimal ascites; fatty infiltration of the liver.  ED discussed case with Hematology, IVF and Dr. Li will see.  Admit to hospital medicine for further eval.    * No surgery found *      Hospital Course:   29-year-old male presented to ED for eval of abdominal pain and admitted with enteritis .  CT abdomen was done at admission with Mild wall thickening of the small bowel with stranding in the adjacent fat suggestive of enteritis. There is no evidence of obstruction . Started antibiotics and improved and ate all food and WBC back to normal . Hb was very high at admission and history of polycythemia and hematology was consulted but his labs quickly better after hydration ( possible error?) and eating well and DC in stable condition . He was tested for  "JAK2 which was negative and [facility] Antibiotics given at discharge        Goals of Care Treatment Preferences:  Code Status: Full Code      SDOH Screening:  The patient was screened for utility difficulties, food insecurity, transport difficulties, housing insecurity, and interpersonal safety and there were no concerns identified this admission.     Consults:     Assessment & Plan  Abdominal pain, vomiting, and diarrhea  Procalcitonin, lactic acid, acute hepatitis panel, blood cx's  UA/UDS  Stool studies  Zosyn for now  Protonix  IVF  I&O  Leucocytosis  See above  COVID/flu swabs  CXR  Polycythemia  Iron studies, coags, daily CBC  IVF  Consult hem/onc    Diabetes mellitus  Patient's FSGs are controlled on current medication regimen.  Last A1c reviewed- No results found for: "LABA1C", "HGBA1C"  Most recent fingerstick glucose reviewed-   Recent Labs   Lab 04/14/25  2137 04/15/25  0804 04/15/25  1112   POCTGLUCOSE 182* 131* 122*     Current correctional scale  Low  Maintain anti-hyperglycemic dose as follows-   Antihyperglycemics (From admission, onward)      None          Hold Oral hypoglycemics while patient is in the hospital.  Final Active Diagnoses:    Diagnosis Date Noted POA    PRINCIPAL PROBLEM:  Abdominal pain, vomiting, and diarrhea [R10.9, R11.10, R19.7] 04/14/2025 Yes    Diabetes mellitus [E11.9] 04/14/2025 Yes    Leucocytosis [D72.829] 08/11/2024 Yes    Polycythemia [D75.1] 08/11/2024 Yes      Problems Resolved During this Admission:       Discharged Condition: good    Disposition: Home or Self Care    Follow Up:   Follow-up Information       [facility]    Specialty: Internal Medicine  Why: Please call to schedule an appointment  Contact information:  [address]  [facility] [address]  [facility] [address] [phone]                           Patient Instructions:      Ambulatory referral/consult to Gastroenterology "   Standing Status: Future   Referral Priority: Routine Referral Type: Consultation   Referral Reason: Specialty Services Required   Requested Specialty: Gastroenterology   Number of Visits Requested: 1     Diet Adult Regular     Activity as tolerated       Significant Diagnostic Studies: Labs: CMP   Recent Labs   Lab 04/14/25  0956 04/15/25  0439    139   K 4.3 4.4   CO2 29 27   BUN 18 14   CREATININE 1.1 1.0   CALCIUM 10.5 9.1   ALBUMIN 5.2 3.9   BILITOT 0.5 0.8   ALKPHOS 69 47*   AST 16 9*   ALT 17 12    and CBC   Recent Labs   Lab 04/14/25  0956 04/15/25  0439   WBC 24.40* 9.89   HGB 20.9* 15.1   HCT 61.6* 45.5    188       Pending Diagnostic Studies:       Procedure Component Value Units Date/Time    EXTRA TUBES [[id number]] Collected: 04/14/25 1640    Order Status: Sent Lab Status: In process Updated: 04/14/25 1659    Specimen: Blood, Venous     Narrative:      The following orders were created for panel order EXTRA TUBES.  Procedure                               Abnormality         Status                     ---------                               -----------         ------                     Light Blue Top Hold[[id number]]                             In process                 Lavender Top Hold[[id number]]                               In process                   Please view results for these tests on the individual orders.    Hepatitis panel, acute [[id number]] Collected: 04/14/25 1634    Order Status: Sent Lab Status: In process Updated: 04/14/25 1654    Specimen: Blood     Toxicology screen, urine [[id number]] Collected: 04/15/25 0319    Order Status: Sent Lab Status: In process Updated: 04/15/25 0327    Specimen: Urine            Medications:  Reconciled Home Medications:      Medication List        START taking these medications      metroNIDAZOLE 500 MG tablet  Commonly known as: FLAGYL  Take 1 tablet (500 mg total) by mouth every 8 (eight) hours. for 7 days            CONTINUE taking  these medications      ALIGN ORAL  Take 1 capsule by mouth Daily.     empagliflozin 25 mg tablet  Commonly known as: Jardiance  Take 1 tablet by mouth every morning.     metFORMIN 500 MG ER 24hr tablet  Commonly known as: GLUCOPHAGE-XR  Take 500 mg by mouth 2 (two) times a day.            STOP taking these medications      citalopram 40 MG tablet  Commonly known as: CeleXA              Indwelling Lines/Drains at time of discharge:   Lines/Drains/Airways       None                 General: Patient resting comfortably in no acute distress. Appears as stated age. Calm  Eyes: EOM intact. No conjunctivae injection. No scleral icterus.  ENT: Hearing grossly intact. No discharge from ears. No nasal discharge.   CVS: RRR. No LE edema BL.  Lungs: CTA BL, no wheezing or crackles. Good breath sounds. No accessory muscle use. No acute respiratory distress  Neuro: non focal , Follows commands. Responds appropriately   Time spent on the discharge of patient: 22 minutes         Héctor Clement MD  Department of Hospital Medicine  [facility]

## 2025-04-15 NOTE — HOSPITAL COURSE
29-year-old male presented to ED for eval of abdominal pain and admitted with enteritis .  CT abdomen was done at admission with Mild wall thickening of the small bowel with stranding in the adjacent fat suggestive of enteritis. There is no evidence of obstruction . Started antibiotics and improved and ate all food and WBC back to normal . Hb was very high at admission and history of polycythemia and hematology was consulted but his labs quickly better after hydration ( possible error?) and eating well and DC in stable condition . He was tested for JAK2 which was negative and [facility]. Antibiotics given at discharge

## 2025-04-15 NOTE — CONSULTS
[facility]  Hematology/Oncology  Consult Note    Patient Name: Yogi Britt  MRN: [id number]  Admission Date: 4/14/2025  Hospital Length of Stay: 1 days  Code Status: Full Code   Attending Provider: Héctor Clement MD  Consulting Provider: Rosalio Li MD  Primary Care Physician: Kristy Prieto DO  Principal Problem:Abdominal pain, vomiting, and diarrhea    Consults  Subjective:     HPI:  Mr. Britt is a 30yo male known to me from prior office visit for polycythemia.  He was tested for JAK2 which was negative and he was followed conservatively afterwards.  He is now admitted with an episode of profound fatigue and found to have a Hb of 20g/dl.  He denies any sob, head aches, chest pain but states that he thinks he may have been dehydrated after drinking beer the day before.  I was contacted yesterday and suggested IV hydration and recheck this morning and his Hb is now normal.  He is feeling better and has no new complaints this morning.     Oncology Treatment Plan:   [No matching plan found]    Medications:  Continuous Infusions:   0.9% NaCl   Intravenous Continuous 100 mL/hr at 04/15/25 0543 Rate Verify at 04/15/25 0543     Scheduled Meds:   aspirin  81 mg Oral Daily    Lactobacillus acidoph-L.bulgar  1 tablet Oral TID WM    pantoprazole  40 mg Intravenous Daily    piperacillin-tazobactam (Zosyn) IV (PEDS and ADULTS) (extended infusion is not appropriate)  4.5 g Intravenous Q8H     PRN Meds:  Current Facility-Administered Medications:     acetaminophen, 650 mg, Oral, Q4H PRN    aluminum-magnesium hydroxide-simethicone, 30 mL, Oral, QID PRN    dextrose 50%, 12.5 g, Intravenous, PRN    dextrose 50%, 25 g, Intravenous, PRN    glucagon (human recombinant), 1 mg, Intramuscular, PRN    glucose, 16 g, Oral, PRN    glucose, 24 g, Oral, PRN    HYDROcodone-acetaminophen, 1 tablet, Oral, Q6H PRN    insulin aspart U-100, 0-5 Units, Subcutaneous, QID (AC + HS) PRN    magnesium oxide, 800 mg,  Oral, PRN    magnesium oxide, 800 mg, Oral, PRN    melatonin, 6 mg, Oral, Nightly PRN    naloxone, 0.02 mg, Intravenous, PRN    potassium bicarbonate, 35 mEq, Oral, PRN    potassium bicarbonate, 50 mEq, Oral, PRN    potassium bicarbonate, 60 mEq, Oral, PRN    potassium, sodium phosphates, 2 packet, Oral, PRN    potassium, sodium phosphates, 2 packet, Oral, PRN    potassium, sodium phosphates, 2 packet, Oral, PRN    promethazine (PHENERGAN) 12.5 mg in 0.9% NaCl 50 mL IVPB, 12.5 mg, Intravenous, Q6H PRN    sodium chloride 0.9%, 10 mL, Intravenous, Q12H PRN     Review of patient's allergies indicates:  No Known Allergies     Past Medical History:   Diagnosis Date    Anxiety     Seizures      History reviewed. No pertinent surgical history.  Family History    None       Tobacco Use    Smoking status: Never    Smokeless tobacco: Never   Substance and Sexual Activity    Alcohol use: Not on file    Drug use: Not on file    Sexual activity: Not on file       Review of Systems   Constitutional:  Negative for fever and unexpected weight change.   HENT:  Negative for nosebleeds.    Respiratory:  Negative for chest tightness and shortness of breath.    Cardiovascular:  Negative for chest pain.   Gastrointestinal:  Negative for abdominal pain and blood in stool.   Genitourinary:  Negative for hematuria.   Skin:  Negative for rash.   Hematological:  Does not bruise/bleed easily.     Objective:     Vital Signs (Most Recent):  Temp: 98 °F (36.7 °C) (04/15/25 0730)  Pulse: 79 (04/15/25 0730)  Resp: 16 (04/15/25 0730)  BP: (!) 141/99 (04/15/25 0730)  SpO2: 98 % (04/15/25 0730) Vital Signs (24h Range):  Temp:  [98 °F (36.7 °C)-98.6 °F (37 °C)] 98 °F (36.7 °C)  Pulse:  [60-91] 79  Resp:  [11-24] 16  SpO2:  [92 %-99 %] 98 %  BP: (101-153)/(64-99) 141/99     Weight: 98.4 kg (217 lb)  Body mass index is 29.43 kg/m².  Body surface area is 2.24 meters squared.      Intake/Output Summary (Last 24 hours) at 4/15/2025 1039  Last data filed at  4/15/2025 0543  Gross per 24 hour   Intake 1468.98 ml   Output --   Net 1468.98 ml        Physical Exam  Constitutional:       Appearance: Normal appearance.   HENT:      Head: Normocephalic and atraumatic.   Eyes:      General: No scleral icterus.     Conjunctiva/sclera: Conjunctivae normal.   Cardiovascular:      Rate and Rhythm: Normal rate.   Pulmonary:      Effort: Pulmonary effort is normal.   Abdominal:      General: Abdomen is flat.   Neurological:      General: No focal deficit present.      Mental Status: He is alert and oriented to person, place, and time.   Psychiatric:         Mood and Affect: Mood normal.         Behavior: Behavior normal.          Significant Labs:   CBC:   Recent Labs   Lab 04/14/25  0956 04/15/25  0439   WBC 24.40* 9.89   HGB 20.9* 15.1   HCT 61.6* 45.5    188       Diagnostic Results:  None  Assessment/Plan:     Polycythemia  While he does have a history of polycythemia,  I think the count yesterday was likely due to lab error with some element of dehydration.  He is normalized at this time and his symptoms appear improved at this time.  I don't think he needs phlebotomy at this point and don't feel further work up is needed either.  I will make sure he has appropriate follow up with me in the office and discussed this with him today.  He can go home at discretion of primary team.          Thank you for your consult. I will follow-up with patient. Please contact us if you have any additional questions.    Rosalio Li MD  Hematology/Oncology  [facility]

## 2025-04-15 NOTE — HPI
Mr. Britt is a 28yo male known to me from prior office visit for polycythemia.  He was tested for JAK2 which was negative and he was followed conservatively afterwards.  He is now admitted with an episode of profound fatigue and found to have a Hb of 20g/dl.  He denies any sob, head aches, chest pain but states that he thinks he may have been dehydrated after drinking beer the day before.  I was contacted yesterday and suggested IV hydration and recheck this morning and his Hb is now normal.  He is feeling better and has no new complaints this morning.

## 2025-04-15 NOTE — ASSESSMENT & PLAN NOTE
"Patient's FSGs are controlled on current medication regimen.  Last A1c reviewed- No results found for: "LABA1C", "HGBA1C"  Most recent fingerstick glucose reviewed-   Recent Labs   Lab 04/14/25  2137 04/15/25  0804 04/15/25  1112   POCTGLUCOSE 182* 131* 122*     Current correctional scale  Low  Maintain anti-hyperglycemic dose as follows-   Antihyperglycemics (From admission, onward)      None          Hold Oral hypoglycemics while patient is in the hospital.  "

## 2025-04-15 NOTE — PLAN OF CARE
DC orders/medications reviewed.  [facility] to [facility] to schedule follow up.  GI referral placed.  No further CM needs identified at this time. Clear to dc from CM standpoint.     04/15/25 1050   Final Note   Assessment Type Final Discharge Note   Anticipated Discharge Disposition Home   What phone number can be called within the next 1-3 days to see how you are doing after discharge? [phone]   Hospital Resources/Appts/Education Provided Patient refused appointment set-up   Post-Acute Status   Discharge Delays None known at this time

## 2025-04-15 NOTE — ASSESSMENT & PLAN NOTE
While he does have a history of polycythemia,  I think the count yesterday was likely due to lab error with some element of dehydration.  He is normalized at this time and his symptoms appear improved at this time.  I don't think he needs phlebotomy at this point and don't feel further work up is needed either.  I will make sure he has appropriate follow up with me in the office and discussed this with him today.  He can go home at discretion of primary team.

## 2025-04-15 NOTE — SUBJECTIVE & OBJECTIVE
Oncology Treatment Plan:   [No matching plan found]    Medications:  Continuous Infusions:   0.9% NaCl   Intravenous Continuous 100 mL/hr at 04/15/25 0543 Rate Verify at 04/15/25 0543     Scheduled Meds:   aspirin  81 mg Oral Daily    Lactobacillus acidoph-L.bulgar  1 tablet Oral TID WM    pantoprazole  40 mg Intravenous Daily    piperacillin-tazobactam (Zosyn) IV (PEDS and ADULTS) (extended infusion is not appropriate)  4.5 g Intravenous Q8H     PRN Meds:  Current Facility-Administered Medications:     acetaminophen, 650 mg, Oral, Q4H PRN    aluminum-magnesium hydroxide-simethicone, 30 mL, Oral, QID PRN    dextrose 50%, 12.5 g, Intravenous, PRN    dextrose 50%, 25 g, Intravenous, PRN    glucagon (human recombinant), 1 mg, Intramuscular, PRN    glucose, 16 g, Oral, PRN    glucose, 24 g, Oral, PRN    HYDROcodone-acetaminophen, 1 tablet, Oral, Q6H PRN    insulin aspart U-100, 0-5 Units, Subcutaneous, QID (AC + HS) PRN    magnesium oxide, 800 mg, Oral, PRN    magnesium oxide, 800 mg, Oral, PRN    melatonin, 6 mg, Oral, Nightly PRN    naloxone, 0.02 mg, Intravenous, PRN    potassium bicarbonate, 35 mEq, Oral, PRN    potassium bicarbonate, 50 mEq, Oral, PRN    potassium bicarbonate, 60 mEq, Oral, PRN    potassium, sodium phosphates, 2 packet, Oral, PRN    potassium, sodium phosphates, 2 packet, Oral, PRN    potassium, sodium phosphates, 2 packet, Oral, PRN    promethazine (PHENERGAN) 12.5 mg in 0.9% NaCl 50 mL IVPB, 12.5 mg, Intravenous, Q6H PRN    sodium chloride 0.9%, 10 mL, Intravenous, Q12H PRN     Review of patient's allergies indicates:  No Known Allergies     Past Medical History:   Diagnosis Date    Anxiety     Seizures      History reviewed. No pertinent surgical history.  Family History    None       Tobacco Use    Smoking status: Never    Smokeless tobacco: Never   Substance and Sexual Activity    Alcohol use: Not on file    Drug use: Not on file    Sexual activity: Not on file       Review of Systems    Constitutional:  Negative for fever and unexpected weight change.   HENT:  Negative for nosebleeds.    Respiratory:  Negative for chest tightness and shortness of breath.    Cardiovascular:  Negative for chest pain.   Gastrointestinal:  Negative for abdominal pain and blood in stool.   Genitourinary:  Negative for hematuria.   Skin:  Negative for rash.   Hematological:  Does not bruise/bleed easily.     Objective:     Vital Signs (Most Recent):  Temp: 98 °F (36.7 °C) (04/15/25 0730)  Pulse: 79 (04/15/25 0730)  Resp: 16 (04/15/25 0730)  BP: (!) 141/99 (04/15/25 0730)  SpO2: 98 % (04/15/25 0730) Vital Signs (24h Range):  Temp:  [98 °F (36.7 °C)-98.6 °F (37 °C)] 98 °F (36.7 °C)  Pulse:  [60-91] 79  Resp:  [11-24] 16  SpO2:  [92 %-99 %] 98 %  BP: (101-153)/(64-99) 141/99     Weight: 98.4 kg (217 lb)  Body mass index is 29.43 kg/m².  Body surface area is 2.24 meters squared.      Intake/Output Summary (Last 24 hours) at 4/15/2025 1035  Last data filed at 4/15/2025 0543  Gross per 24 hour   Intake 1468.98 ml   Output --   Net 1468.98 ml        Physical Exam  Constitutional:       Appearance: Normal appearance.   HENT:      Head: Normocephalic and atraumatic.   Eyes:      General: No scleral icterus.     Conjunctiva/sclera: Conjunctivae normal.   Cardiovascular:      Rate and Rhythm: Normal rate.   Pulmonary:      Effort: Pulmonary effort is normal.   Abdominal:      General: Abdomen is flat.   Neurological:      General: No focal deficit present.      Mental Status: He is alert and oriented to person, place, and time.   Psychiatric:         Mood and Affect: Mood normal.         Behavior: Behavior normal.          Significant Labs:   CBC:   Recent Labs   Lab 04/14/25  0956 04/15/25  0439   WBC 24.40* 9.89   HGB 20.9* 15.1   HCT 61.6* 45.5    188       Diagnostic Results:  None

## 2025-04-16 ENCOUNTER — PATIENT OUTREACH (OUTPATIENT)
Dept: ADMINISTRATIVE | Facility: CLINIC | Age: 30
End: 2025-04-16
Payer: OTHER GOVERNMENT

## 2025-04-16 LAB
HAV IGM SERPL QL IA: NEGATIVE
HBV CORE IGM SERPL QL IA: NEGATIVE
HBV SURFACE AG SERPL QL IA: NEGATIVE
HCV AB SERPL QL IA: NON REACTIVE
HCV AB SERPL QL IA: NORMAL

## 2025-04-19 LAB
BACTERIA BLD CULT: NORMAL
BACTERIA BLD CULT: NORMAL

## 2025-04-22 ENCOUNTER — PATIENT MESSAGE (OUTPATIENT)
Dept: GASTROENTEROLOGY | Facility: CLINIC | Age: 30
End: 2025-04-22
Payer: OTHER GOVERNMENT